# Patient Record
Sex: FEMALE | Race: WHITE | NOT HISPANIC OR LATINO | Employment: OTHER | URBAN - METROPOLITAN AREA
[De-identification: names, ages, dates, MRNs, and addresses within clinical notes are randomized per-mention and may not be internally consistent; named-entity substitution may affect disease eponyms.]

---

## 2023-07-03 LAB
ALANINE AMINOTRANSFERASE (SGPT) (U/L) IN SER/PLAS: 17 U/L (ref 7–45)
ALBUMIN (G/DL) IN SER/PLAS: 4.2 G/DL (ref 3.4–5)
ALKALINE PHOSPHATASE (U/L) IN SER/PLAS: 65 U/L (ref 33–136)
ANION GAP IN SER/PLAS: 12 MMOL/L (ref 10–20)
ASPARTATE AMINOTRANSFERASE (SGOT) (U/L) IN SER/PLAS: 15 U/L (ref 9–39)
BILIRUBIN TOTAL (MG/DL) IN SER/PLAS: 0.7 MG/DL (ref 0–1.2)
CALCIUM (MG/DL) IN SER/PLAS: 10 MG/DL (ref 8.6–10.3)
CARBON DIOXIDE, TOTAL (MMOL/L) IN SER/PLAS: 29 MMOL/L (ref 21–32)
CHLORIDE (MMOL/L) IN SER/PLAS: 104 MMOL/L (ref 98–107)
CHOLESTEROL (MG/DL) IN SER/PLAS: 144 MG/DL (ref 0–199)
CHOLESTEROL IN HDL (MG/DL) IN SER/PLAS: 54.6 MG/DL
CHOLESTEROL/HDL RATIO: 2.6
CREATININE (MG/DL) IN SER/PLAS: 0.91 MG/DL (ref 0.5–1.05)
GFR FEMALE: 65 ML/MIN/1.73M2
GLUCOSE (MG/DL) IN SER/PLAS: 93 MG/DL (ref 74–99)
LDL: 65 MG/DL (ref 0–99)
POTASSIUM (MMOL/L) IN SER/PLAS: 4.2 MMOL/L (ref 3.5–5.3)
PROTEIN TOTAL: 6.7 G/DL (ref 6.4–8.2)
SODIUM (MMOL/L) IN SER/PLAS: 141 MMOL/L (ref 136–145)
TRIGLYCERIDE (MG/DL) IN SER/PLAS: 122 MG/DL (ref 0–149)
UREA NITROGEN (MG/DL) IN SER/PLAS: 17 MG/DL (ref 6–23)
VLDL: 24 MG/DL (ref 0–40)

## 2023-10-04 ENCOUNTER — TELEPHONE (OUTPATIENT)
Dept: PULMONOLOGY | Facility: CLINIC | Age: 78
End: 2023-10-04
Payer: MEDICARE

## 2023-10-04 DIAGNOSIS — J45.40 MODERATE PERSISTENT ASTHMA WITHOUT COMPLICATION (HHS-HCC): Primary | ICD-10-CM

## 2023-10-04 RX ORDER — BUDESONIDE AND FORMOTEROL FUMARATE DIHYDRATE 160; 4.5 UG/1; UG/1
2 AEROSOL RESPIRATORY (INHALATION)
Qty: 180 EACH | Refills: 3 | Status: SHIPPED | OUTPATIENT
Start: 2023-10-04 | End: 2024-03-18 | Stop reason: SDUPTHER

## 2023-10-04 NOTE — TELEPHONE ENCOUNTER
Patient would like to discuss her nebulizer meds making her feel sick. She would like to know if she can go back on an inhaler...please call to advise

## 2023-10-04 NOTE — TELEPHONE ENCOUNTER
Patient with like to stop the budesonide nebulization and a formoterol nebulization and go back to Symbicort.  That is fine.  I will call in a prescription to [facility] along with a spacer.

## 2023-10-20 ENCOUNTER — APPOINTMENT (OUTPATIENT)
Dept: SURGERY | Facility: CLINIC | Age: 78
End: 2023-10-20

## 2023-12-07 ENCOUNTER — APPOINTMENT (OUTPATIENT)
Dept: RADIOLOGY | Facility: CLINIC | Age: 78
End: 2023-12-07
Payer: MEDICARE

## 2023-12-22 ENCOUNTER — TELEPHONE (OUTPATIENT)
Dept: CARDIOLOGY | Facility: HOSPITAL | Age: 78
End: 2023-12-22
Payer: MEDICARE

## 2023-12-22 DIAGNOSIS — I10 HYPERTENSION, UNSPECIFIED TYPE: Primary | ICD-10-CM

## 2023-12-22 RX ORDER — AMLODIPINE BESYLATE 5 MG/1
5 TABLET ORAL DAILY
COMMUNITY
End: 2023-12-22 | Stop reason: SDUPTHER

## 2023-12-22 RX ORDER — AMLODIPINE BESYLATE 5 MG/1
5 TABLET ORAL DAILY
Qty: 90 TABLET | Refills: 3 | Status: SHIPPED | OUTPATIENT
Start: 2023-12-22 | End: 2024-12-21

## 2023-12-31 ENCOUNTER — DOCUMENTATION (OUTPATIENT)
Dept: PULMONOLOGY | Facility: HOSPITAL | Age: 78
End: 2023-12-31
Payer: MEDICARE

## 2023-12-31 DIAGNOSIS — U07.1 COVID: Primary | ICD-10-CM

## 2023-12-31 NOTE — PROGRESS NOTES
Patient has come down with acute COVID and with her underlying chronic lung disease she really should be on an antiviral.  Paxlovid will interact with her Lamictal and alternatively we will give molnupiravir which will not interact.

## 2024-01-04 ENCOUNTER — APPOINTMENT (OUTPATIENT)
Dept: RADIOLOGY | Facility: CLINIC | Age: 79
End: 2024-01-04
Payer: MEDICARE

## 2024-01-11 ENCOUNTER — APPOINTMENT (OUTPATIENT)
Dept: CARDIOLOGY | Facility: HOSPITAL | Age: 79
End: 2024-01-11
Payer: MEDICARE

## 2024-01-15 ENCOUNTER — DOCUMENTATION (OUTPATIENT)
Dept: PULMONOLOGY | Facility: HOSPITAL | Age: 79
End: 2024-01-15
Payer: MEDICARE

## 2024-01-15 DIAGNOSIS — J01.90 ACUTE NON-RECURRENT SINUSITIS, UNSPECIFIED LOCATION: Primary | ICD-10-CM

## 2024-01-15 RX ORDER — AMOXICILLIN AND CLAVULANATE POTASSIUM 500; 125 MG/1; MG/1
500 TABLET, FILM COATED ORAL 2 TIMES DAILY
Qty: 20 TABLET | Refills: 0 | Status: SHIPPED | OUTPATIENT
Start: 2024-01-15 | End: 2024-01-25

## 2024-01-15 NOTE — PROGRESS NOTES
Patient has gotten over her acute COVID and now she has a sinusitis.  Postnasal drip and congestion in her sinuses.  Will place her on Augmentin and she will keep me posted.

## 2024-01-18 ENCOUNTER — TELEPHONE (OUTPATIENT)
Dept: SCHEDULING | Age: 79
End: 2024-01-18
Payer: MEDICARE

## 2024-01-18 DIAGNOSIS — I10 ESSENTIAL HYPERTENSION: Primary | ICD-10-CM

## 2024-01-23 RX ORDER — CLONIDINE HYDROCHLORIDE 0.1 MG/1
0.1 TABLET ORAL DAILY
Qty: 90 TABLET | Refills: 3 | Status: SHIPPED | OUTPATIENT
Start: 2024-01-23 | End: 2025-01-22

## 2024-02-08 ENCOUNTER — HOSPITAL ENCOUNTER (EMERGENCY)
Facility: HOSPITAL | Age: 79
Discharge: HOME | End: 2024-02-08
Payer: MEDICARE

## 2024-02-08 VITALS
HEART RATE: 73 BPM | TEMPERATURE: 97.5 F | DIASTOLIC BLOOD PRESSURE: 68 MMHG | SYSTOLIC BLOOD PRESSURE: 128 MMHG | WEIGHT: 155 LBS | BODY MASS INDEX: 22.96 KG/M2 | HEIGHT: 69 IN | OXYGEN SATURATION: 97 % | RESPIRATION RATE: 16 BRPM

## 2024-02-08 DIAGNOSIS — R04.0 ANTERIOR EPISTAXIS: Primary | ICD-10-CM

## 2024-02-08 PROCEDURE — 99283 EMERGENCY DEPT VISIT LOW MDM: CPT

## 2024-02-08 PROCEDURE — 2500000001 HC RX 250 WO HCPCS SELF ADMINISTERED DRUGS (ALT 637 FOR MEDICARE OP)

## 2024-02-08 PROCEDURE — 99282 EMERGENCY DEPT VISIT SF MDM: CPT | Mod: 27

## 2024-02-08 RX ORDER — OXYMETAZOLINE HCL 0.05 %
2 SPRAY, NON-AEROSOL (ML) NASAL ONCE
Status: COMPLETED | OUTPATIENT
Start: 2024-02-08 | End: 2024-02-08

## 2024-02-08 RX ORDER — OXYMETAZOLINE HCL 0.05 %
SPRAY, NON-AEROSOL (ML) NASAL
Status: COMPLETED
Start: 2024-02-08 | End: 2024-02-08

## 2024-02-08 RX ADMIN — OXYMETAZOLINE HYDROCHLORIDE 2 SPRAY: 0.05 SPRAY NASAL at 12:10

## 2024-02-08 RX ADMIN — Medication 2 SPRAY: at 12:10

## 2024-02-08 ASSESSMENT — COLUMBIA-SUICIDE SEVERITY RATING SCALE - C-SSRS
2. HAVE YOU ACTUALLY HAD ANY THOUGHTS OF KILLING YOURSELF?: NO
6. HAVE YOU EVER DONE ANYTHING, STARTED TO DO ANYTHING, OR PREPARED TO DO ANYTHING TO END YOUR LIFE?: NO
1. IN THE PAST MONTH, HAVE YOU WISHED YOU WERE DEAD OR WISHED YOU COULD GO TO SLEEP AND NOT WAKE UP?: NO

## 2024-02-08 NOTE — ED PROVIDER NOTES
Chief Complaint   Patient presents with    Epistaxis (Nose Bleed)     Pt reports having nose bleed for the past hour. Pt states she has been having frequent nose bleeds for the past month and has had approximately four in the last 30 days. Pt states she take 81mg ASA at night.      HPI:   Luis Ann is an 78 y.o.-year-old female with a history of nosebleeds presenting with epistaxis that started today.  She explains her nose was bleeding on and off for about 2 hours.  She was holding pressure during this time.  She explains that the nose will bleed and she will blow her nose and the clot will come off and the nose will begin bleeding again.  She is unsure of triggers. She explains that since arriving to the ED the bleeding has stopped.  She explains she has been taking her blood pressure medications as prescribed.  She takes 81 mg ASA daily.  Denies trauma/picking.  No dizziness or lightheadedness.  No chest pain, palpitations, or shortness of breath.  Denies headache.  Denies constitutional symptoms.     Medications: Amlodipine, clonidine, Symbicort  Soc HX: Denies substance use  Allergies   Allergen Reactions    Penicillins GI Upset   :  Past Medical History:   Diagnosis Date    Other injury of unspecified body region, initial encounter 10/18/2016    Broken bones    Other specified noninflammatory disorders of vagina 09/21/2020    Vaginal irritation    Personal history of other mental and behavioral disorders     History of depression    Pure hypercholesterolemia, unspecified     High cholesterol     Past Surgical History:   Procedure Laterality Date    CT ANGIO HEART CORONARY  8/10/2017    CT HEART CORONARY ANGIOGRAM 8/10/2017 [facility]    CT ANGIO HEART CORONARY  10/7/2022    CT HEART CORONARY ANGIOGRAM 10/7/2022 [facility]    FOOT SURGERY  09/19/2013    Foot Surgery Right    LUMBAR LAMINECTOMY  06/23/2015    Laminectomy Lumbar    MOUTH SURGERY  10/16/2015    Oral Surgery Tooth Extraction     OTHER SURGICAL HISTORY  10/14/2014    Destruction Of Hemorrhoids    RHINOPLASTY  10/14/2014    Rhinoplasty     No family history on file.     Physical Exam  Vitals and nursing note reviewed.   Constitutional:       General: She is not in acute distress.     Appearance: She is well-developed.   HENT:      Head: Normocephalic and atraumatic.      Right Ear: Tympanic membrane normal.      Left Ear: Tympanic membrane normal.      Nose:      Comments: There is a small 2 mm area in the anterior left nare that has clotted     Mouth/Throat:      Mouth: Mucous membranes are moist.      Pharynx: Oropharynx is clear.   Eyes:      Extraocular Movements: Extraocular movements intact.      Conjunctiva/sclera: Conjunctivae normal.      Pupils: Pupils are equal, round, and reactive to light.   Cardiovascular:      Rate and Rhythm: Normal rate and regular rhythm.      Heart sounds: No murmur heard.  Pulmonary:      Effort: Pulmonary effort is normal. No respiratory distress.      Breath sounds: Normal breath sounds.   Abdominal:      General: There is no distension.      Palpations: Abdomen is soft.      Tenderness: There is no abdominal tenderness. There is no guarding.   Musculoskeletal:         General: No swelling.      Cervical back: Neck supple.   Skin:     General: Skin is warm and dry.      Capillary Refill: Capillary refill takes less than 2 seconds.   Neurological:      General: No focal deficit present.      Mental Status: She is alert and oriented to person, place, and time.   Psychiatric:         Mood and Affect: Mood normal.         Behavior: Behavior normal.           VS: As documented in the triage note and EMR flowsheet from this visit were reviewed.    Medical Decision Making: This is a 78-year-old female presenting with epistaxis.  She explains that she has a history of these for over the last couple years.  She explains that the nose will bleed and she will blow it and the clot will break loose and that will  begin bleeding again.  Differential diagnosis includes hypertensive emergency, epistaxis, vaginal trauma, rhinosinusitis,  Diagnoses as of 02/08/24 1217   Anterior epistaxis       Procedures    Social Determinants of Health: None     Prescription Drug Consideration: Considered NSAIDs but she takes 81 mg of ASA daily    Counseling: Spoke with the patient and discussed today´s findings, in addition to providing specific details for the plan of care and expected course.  Patient was given the opportunity to ask questions.    Discussed return precautions and importance of follow-up.  Advised to follow-up with ENT.    Advised to return to the ED for changing or worsening symptoms, new symptoms, complaint specific precautions, and precautions listed on the discharge paperwork.  Educated on the common potential side effects of medications prescribed.    I advised the patient that the emergency evaluation and treatment provided today doesn't end their need for medical care. It is very important that they follow-up with their primary care provider or other specialist as instructed.    The plan of care was mutually agreed upon with the patient. The patient and/or family were given the opportunity to ask questions. All questions asked today in the ED were answered to the best of my ability with today's information.    I specifically advised the patient to return to the ED for changing or worsening symptoms, worrisome new symptoms, or for any complaint specific precautions listed on the discharge paperwork.    This patient was cared for in the setting of nationwide stress on resources and staffing.    This report was transcribed using voice recognition software.  Every effort was made to ensure accuracy, however, inadvertently computerized transcription errors may be present.       Krishan Mejias PA-C  02/08/24 [id number]

## 2024-02-14 ENCOUNTER — HOSPITAL ENCOUNTER (OUTPATIENT)
Dept: RADIOLOGY | Facility: CLINIC | Age: 79
Discharge: HOME | End: 2024-02-14
Payer: MEDICARE

## 2024-02-14 ENCOUNTER — OFFICE VISIT (OUTPATIENT)
Dept: OTOLARYNGOLOGY | Facility: CLINIC | Age: 79
End: 2024-02-14
Payer: MEDICARE

## 2024-02-14 VITALS — WEIGHT: 155 LBS | HEIGHT: 69 IN | BODY MASS INDEX: 22.96 KG/M2

## 2024-02-14 DIAGNOSIS — R04.0 EPISTAXIS: ICD-10-CM

## 2024-02-14 DIAGNOSIS — J34.89 NASAL LESION: Primary | ICD-10-CM

## 2024-02-14 DIAGNOSIS — J32.3 CHRONIC SPHENOIDAL SINUSITIS: ICD-10-CM

## 2024-02-14 DIAGNOSIS — Z12.39 ENCOUNTER FOR OTHER SCREENING FOR MALIGNANT NEOPLASM OF BREAST: ICD-10-CM

## 2024-02-14 PROCEDURE — 1036F TOBACCO NON-USER: CPT | Performed by: NURSE PRACTITIONER

## 2024-02-14 PROCEDURE — 77067 SCR MAMMO BI INCL CAD: CPT

## 2024-02-14 PROCEDURE — 1160F RVW MEDS BY RX/DR IN RCRD: CPT | Performed by: NURSE PRACTITIONER

## 2024-02-14 PROCEDURE — 77067 SCR MAMMO BI INCL CAD: CPT | Mod: BILATERAL PROCEDURE | Performed by: RADIOLOGY

## 2024-02-14 PROCEDURE — 31231 NASAL ENDOSCOPY DX: CPT | Performed by: NURSE PRACTITIONER

## 2024-02-14 PROCEDURE — 1157F ADVNC CARE PLAN IN RCRD: CPT | Performed by: NURSE PRACTITIONER

## 2024-02-14 PROCEDURE — 1125F AMNT PAIN NOTED PAIN PRSNT: CPT | Performed by: NURSE PRACTITIONER

## 2024-02-14 PROCEDURE — 1159F MED LIST DOCD IN RCRD: CPT | Performed by: NURSE PRACTITIONER

## 2024-02-14 PROCEDURE — 99214 OFFICE O/P EST MOD 30 MIN: CPT | Performed by: NURSE PRACTITIONER

## 2024-02-14 PROCEDURE — 77063 BREAST TOMOSYNTHESIS BI: CPT | Mod: BILATERAL PROCEDURE | Performed by: RADIOLOGY

## 2024-02-14 RX ORDER — MUPIROCIN 20 MG/G
OINTMENT TOPICAL
Qty: 30 G | Refills: 0 | Status: SHIPPED | OUTPATIENT
Start: 2024-02-14

## 2024-02-14 RX ORDER — ESCITALOPRAM OXALATE 20 MG/1
20 TABLET ORAL
COMMUNITY
Start: 2008-08-28

## 2024-02-14 RX ORDER — ASPIRIN 81 MG/1
81 TABLET ORAL
COMMUNITY

## 2024-02-14 RX ORDER — LAMOTRIGINE 200 MG/1
100 TABLET ORAL 2 TIMES DAILY
COMMUNITY
Start: 2022-01-15

## 2024-02-14 RX ORDER — LOSARTAN POTASSIUM 50 MG/1
100 TABLET ORAL
COMMUNITY
Start: 2018-02-27 | End: 2024-03-28 | Stop reason: SDUPTHER

## 2024-02-14 RX ORDER — EZETIMIBE 10 MG/1
10 TABLET ORAL DAILY
COMMUNITY
Start: 2017-08-15

## 2024-02-14 RX ORDER — MONTELUKAST SODIUM 10 MG/1
10 TABLET ORAL DAILY
COMMUNITY
End: 2024-03-18 | Stop reason: SDUPTHER

## 2024-02-14 RX ORDER — CLONIDINE HYDROCHLORIDE 0.1 MG/1
0.1 TABLET ORAL
COMMUNITY
End: 2024-03-28 | Stop reason: ALTCHOICE

## 2024-02-14 RX ORDER — GABAPENTIN 300 MG/1
300 CAPSULE ORAL NIGHTLY
COMMUNITY
Start: 2022-06-09

## 2024-02-14 ASSESSMENT — PATIENT HEALTH QUESTIONNAIRE - PHQ9
2. FEELING DOWN, DEPRESSED OR HOPELESS: NOT AT ALL
SUM OF ALL RESPONSES TO PHQ9 QUESTIONS 1 AND 2: 0
1. LITTLE INTEREST OR PLEASURE IN DOING THINGS: NOT AT ALL

## 2024-02-14 NOTE — PROGRESS NOTES
Subjective   Patient ID: Luis Ann is a 78 y.o. female who presents for New Patient Visit and Epistaxis (Nose Bleed).  HPI  Luis Ann  is a 78 y.o. old female, referred by Krishan MEHTA, who presents for evaluation of epistaxis or nosebleeds that started on the left side many years ago. The nosebleeds generally last about an hour and stop with holding pressure.  Prior intervention for nasal bleeding includes nasal cautery. The patient has history of epistaxis. The last episode began on the left side about 6 days ago.  The patient has been to the ER for the nosebleeds. The nose has been cauterized in the past 4 times most recently about 10 years ago. Patient reports taking the following anticoagulants/antiplatelet agents: ASA 81mg.      Patient denies hx trauma. No history of uncontrolled hypertension. Pt has no history of significant exposure to toxic fumes, nickel, or wood dust.  Patient denies intranasal steroid use, repetitive digital trauma, or intranasal drug use.     Patient has history of rhinoplasty at 16 yo. She also had a procedure by Dr. Hernandez Sutton for migraines.    Patient denies any other sinonasal complaints.     Prior imaging: CT Head March 2023 from [facility]. Report reads near complete opacification of the left sphenoid sinus.     I have also reviewed prior note from Krishan MEHTA dated 2/8/24 and this is contributing to my history and assessment.     Review of Systems  Review of systems is negative for constitutional, ophthalmological, cardiac, pulmonary, renal, gastrointestinal, musculoskeletal, mental health, endocrine, or neurologic disorders (except as listed in the HPI, PMH, and Problem List).     Objective   Physical Exam  CONSTITUTIONAL: Vital signs reviewed. Patient appears well developed and well nourished.   GENERAL: this is a healthy appearing female who appears stated age. The patient is alert and appropriately verbally conversant without hoarseness. This  patient is in no apparent distress.   FACE: The face was inspected and no cutaneous masses or lesions were visualized. There was no erythema or edema noted. Facial movement was symmetric. No skin lesions were detected. There was no sinus tenderness elicited. TMJ crepitus absent.   EYES: Extra-ocular muscle function was intact. No nystagmus was observed. Pupils were equal.   CRANIAL NERVES: Cranial nerves II, III, IV, and VI were noted to be intact via extra-ocular muscle movement testing. Cranial nerve VII noted to be intact and symmetric by facial movement. Cranial nerves IX and X noted to be intact by gag reflex and palatal movement. Cranial nerve XII noted to be intact by active and symmetric tongue movement.   NOSE: Examination of the nose revealed the nasal dorsum to be midline. Intranasal exam reveals the septum is deviated to the left posteriorly. Along the left anterior septum, there is a thick growth protruding from the septum with scabbing. The inferior turbinates were healthy. No masses, polyps, mucopus, or other lesion on anterior rhinoscopy. See below procedure note as applicable for further exam.  ORAL CAVITY: Examination of the oral cavity revealed no mass lesions nor infection. The palate was noted to be intact. The tongue exhibited normal mobility. Mucosa was moist without lesion. The lips were free of lesion. Gums were free of inflammation. Dentition: normal without obvious infection or inflammation  OROPHARYNX: The oral pharynx was free of mass lesion or mucosal abnormality. The palate was noted to be without lesion. The uvula was normal appearing. The tonsils were Normal.  EARS: Examination of the ears revealed that the auricles were normally formed with no lesions. The external auditory canals were normal. The tympanic membranes were intact.  There is no inflammation visualized.   NECK: Visualization and palpation of the neck revealed no mass lesions. No skin lesions or inflammatory processes  were detected. The cervical musculature was normal to palpation.   CERVICAL LYMPHATICS: There were no palpable lymph nodes in the posterior triangle, submandibular triangle, jugulodigastric region, or central neck.  RESPIRATORY: Normal inspiration and expiration and chest wall expansion, no use of accessory muscles to breathe, no stridor.  NEUROLOGICAL: Patient is ambulatory without assist. Mentation is clear. Answering questions appropriately.     Nasal / sinus endoscopy    Date/Time: 2/14/2024 3:23 PM    Performed by: SHARON Latham  Authorized by: SHARON Latham    Consent:     Consent obtained:  Verbal    Consent given by:  Patient    Risks discussed:  Bleeding, infection and pain    Alternatives discussed:  No treatment, observation and delayed treatment  Procedure details:     Indications: assessment of airway and sino-nasal symptoms      Medication:  Afrin and Khadar-Synephrine 2%    Instrument: flexible fiberoptic nasal endoscope      Scope location: bilateral nare    Post-procedure details:     Patient tolerance of procedure:  Tolerated well, no immediate complications  Comments:      Findings: After topical decongestion with decongestant and anesthetic spray, nasal endoscopy was performed using an endoscope. The septum was deviated left posteriorly. Along the left anterior septum, there is a thick scabbed growth protruding from the septum. The inferior turbinates were healthy.  The middle turbinates appeared healthy, the middle meatus is free of purulence, masses, lesions or polyps. The superior meatus and sphenoethmoid recess are clear bilaterally. The nasal passageway is patent. The nasopharynx was clear. There were no complications and the patient tolerated the procedure well.        Assessment/Plan     ASSESSMENT:   Luis Ann is a 78 y.o. year old female with epistaxis localized to a thick growth along the left nasal septum. 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 Will obtain prior imaging from [place] prior to recommendations.     PLAN:   1. Nasal endoscopy: mild left dev, thick scabbing/growth from the left anterior septum.   2. Prior [facility]. Report reads near complete opacification of the left sphenoid sinus.   3. I recommended the patient use a humidifier in the bedroom and in the house  4. Patient advised to use mupirocin ointment at least 2 times per day, by placing on the pad of the thumb and swiping into the nostril, going forward after next follow up. Prescribed today.  5. Discussed epistaxis prevention and acute treatment - Afrin spray PRN for bleeding, apply pressure for 10 minutes if bleeding occurs.  6. I will contact the patient upon receiving the imaging from [place] with further recommendations. Patient agrees with established plan of care and all questions were answered.

## 2024-02-14 NOTE — PATIENT INSTRUCTIONS
Today you were evaluated by Lisa Alvares CNP.    Please follow-up to be determined. If you have any questions or concerns, please contact my office at [phone].     - Begin Mupirocin ointment 3 times daily FOR 4 WEEKS as directed. Use the pads of your fingers to apply the ointment and then sniff back gently. Do not use a cue tip or finger nail to place the ointment as this can cause further trauma. IF THE PRESCRIBED OINTMENT IS TOO EXPENSIVE THEN JUST USE OVER THE COUNTER BACITRACIN OR TRIPLE ANTIBIOTIC OINTMENT.     NOSE CARE and NOSEBLEED PREVENTION  - Do not stick anything in your nose other than the medicine as noted below. DO NOT pick your nose as this will cause the bleeding  - Avoid any nose blowing, sneeze with your mouth open, avoid any maneuvers that increase the blood pressure in your head (such as straining on the toilet) and keep your head above your heart as much as possible.  - We recommended the patient use a humidifier in the bedroom and in the house.  - Start using nasal saline gel (Ayr nasal gel) at least 3 times per day. Alternatively, you can also use Vasaline three times daily. You can also use a saline nasal spray (Ocean nasal spray) 4-6 times daily. These are all over the counter medications  - We discussed nosebleed prevention and acute treatment - Afrin or oxymetazoline spray, 2 sprays in each nostril for bleeding, apply pressure for 10 minutes across the soft part of the nose (pinch your nostrils together). If bleeding occurs reapply for another 10 minutes. If this doesn't work then call our office or go to the Emergency Department.

## 2024-02-22 ENCOUNTER — TELEPHONE (OUTPATIENT)
Dept: OTOLARYNGOLOGY | Facility: CLINIC | Age: 79
End: 2024-02-22
Payer: MEDICARE

## 2024-02-22 DIAGNOSIS — J32.3 CHRONIC SPHENOIDAL SINUSITIS: ICD-10-CM

## 2024-02-22 DIAGNOSIS — J32.8 OTHER CHRONIC SINUSITIS: ICD-10-CM

## 2024-02-22 DIAGNOSIS — J34.89 NASAL LESION: ICD-10-CM

## 2024-02-22 DIAGNOSIS — J32.8 OTHER CHRONIC SINUSITIS: Primary | ICD-10-CM

## 2024-02-22 RX ORDER — DOXYCYCLINE 100 MG/1
100 TABLET ORAL 2 TIMES DAILY
Qty: 20 TABLET | Refills: 0 | Status: SHIPPED | OUTPATIENT
Start: 2024-02-22 | End: 2024-03-03

## 2024-02-22 RX ORDER — DOXYCYCLINE 100 MG/1
100 TABLET ORAL 2 TIMES DAILY
Qty: 20 TABLET | Refills: 0 | Status: SHIPPED | OUTPATIENT
Start: 2024-02-22 | End: 2024-02-22 | Stop reason: SDUPTHER

## 2024-02-22 NOTE — TELEPHONE ENCOUNTER
Called patient to discuss that I have not received her imaging from [place]. We discussed that prior imaging indicated that she has opacification of her sphenoid sinus and I recommended antibiotic therapy and CT Sinus to further assess this. She verbalizes agreement. I recommended doxycycline BID x 10 days. Advised to take with food and discontinue for adverse effects. Recommended she obtain a CT Sinus for surgical planning following completion after. Patient verbalizes understanding instructions and agrees with established plan of care.

## 2024-03-07 ENCOUNTER — HOSPITAL ENCOUNTER (OUTPATIENT)
Dept: RADIOLOGY | Facility: CLINIC | Age: 79
Discharge: HOME | End: 2024-03-07
Payer: MEDICARE

## 2024-03-07 DIAGNOSIS — J32.8 OTHER CHRONIC SINUSITIS: ICD-10-CM

## 2024-03-07 PROCEDURE — 70486 CT MAXILLOFACIAL W/O DYE: CPT

## 2024-03-13 ENCOUNTER — APPOINTMENT (OUTPATIENT)
Dept: ORTHOPEDIC SURGERY | Facility: CLINIC | Age: 79
End: 2024-03-13
Payer: MEDICARE

## 2024-03-15 ENCOUNTER — TELEMEDICINE (OUTPATIENT)
Dept: OTOLARYNGOLOGY | Facility: CLINIC | Age: 79
End: 2024-03-15
Payer: MEDICARE

## 2024-03-15 DIAGNOSIS — J32.3 CHRONIC SPHENOIDAL SINUSITIS: Primary | ICD-10-CM

## 2024-03-15 PROCEDURE — 1160F RVW MEDS BY RX/DR IN RCRD: CPT | Performed by: NURSE PRACTITIONER

## 2024-03-15 PROCEDURE — 1036F TOBACCO NON-USER: CPT | Performed by: NURSE PRACTITIONER

## 2024-03-15 PROCEDURE — 1157F ADVNC CARE PLAN IN RCRD: CPT | Performed by: NURSE PRACTITIONER

## 2024-03-15 PROCEDURE — 1159F MED LIST DOCD IN RCRD: CPT | Performed by: NURSE PRACTITIONER

## 2024-03-15 PROCEDURE — 99213 OFFICE O/P EST LOW 20 MIN: CPT | Performed by: NURSE PRACTITIONER

## 2024-03-15 NOTE — PROGRESS NOTES
Subjective   Patient ID: Luis Ann is a 78 y.o. female who presents for No chief complaint on file..  HPI  Luis Ann is a 78 y.o. year old female with epistaxis localized to a thick growth along the left nasal septum. Incidentally noted to have chronic sphenoid sinusitis versus a sphenoid mass. Will obtain prior imaging from [place] prior to recommendations.   3/15/24- TELEHEALTH (audio only)- Patient presents for follow up visit. She has a history of migraine headaches and frequent headaches to the left side of her head, behind her eye. This has been consistent her whole life. Her bleeding has subsided with use of the mupirocin ointment. She denies any nasal drainage    I personally reviewed the patients CT scan images and results. I discussed the results personally with the patient. The following findings were discussed: 3/7/24: CT Sinus: Septum midline. Near complete opacification of the left sphenoid sinus. Appears to potentially have altered density elements.       Review of Systems  Review of systems is negative for constitutional, ophthalmological, cardiac, pulmonary, renal, gastrointestinal, musculoskeletal, mental health, endocrine, or neurologic disorders (except as listed in the HPI, PMH, and Problem List).     Objective   Physical Exam    Assessment/Plan     ASSESSMENT:   Luis Ann is a 78 y.o. year old female with epistaxis localized to a thick growth along the left nasal septum. Incidentally noted to have chronic sphenoid sinusitis versus a sphenoid mass. Will obtain prior imaging from [place] prior to recommendations.   3/15/24- TELEHEALTH (audio)- Near complete opacification of the left sphenoid sinus. Surgical referral to Dr. Deejay Pierce.    PLAN:   1. Telehealth visit today.  2. I personally reviewed the patients CT scan images and results. I discussed the results personally with the patient. The following findings were discussed: 3/7/24: CT Sinus: Septum midline. Near  complete opacification of the left sphenoid sinus. Appears to potentially have altered density elements.   3. The patient and myself had an extensive discussion regarding next steps for further management. We discussed endoscopic sinus surgery at length. We discussed implications for treatment. Patient referred for surgical management with Dr. Deejay Pierce. His office was contacted directly for scheduling.  4. Patient agrees with established plan of care and all questions were answered.    11 minutes was spent on this patient's visit. More than 50% of that time was spent in counseling regarding the possible etiologies, test results, treatment options and coordinating care.

## 2024-03-18 DIAGNOSIS — J45.40 MODERATE PERSISTENT ASTHMA WITHOUT COMPLICATION (HHS-HCC): ICD-10-CM

## 2024-03-18 DIAGNOSIS — E78.5 HYPERLIPIDEMIA, UNSPECIFIED HYPERLIPIDEMIA TYPE: Primary | ICD-10-CM

## 2024-03-18 RX ORDER — ATORVASTATIN CALCIUM 40 MG/1
40 TABLET, FILM COATED ORAL DAILY
Qty: 30 TABLET | Refills: 11 | Status: SHIPPED | OUTPATIENT
Start: 2024-03-18 | End: 2025-03-13

## 2024-03-18 RX ORDER — BUDESONIDE AND FORMOTEROL FUMARATE DIHYDRATE 160; 4.5 UG/1; UG/1
2 AEROSOL RESPIRATORY (INHALATION)
Qty: 10.2 G | Refills: 11 | Status: SHIPPED | OUTPATIENT
Start: 2024-03-18 | End: 2024-09-14

## 2024-03-18 RX ORDER — MONTELUKAST SODIUM 10 MG/1
10 TABLET ORAL DAILY
Qty: 30 TABLET | Refills: 11 | Status: SHIPPED | OUTPATIENT
Start: 2024-03-18

## 2024-03-18 RX ORDER — ATORVASTATIN CALCIUM 40 MG/1
40 TABLET, FILM COATED ORAL DAILY
COMMUNITY
End: 2024-03-18 | Stop reason: SDUPTHER

## 2024-03-27 DIAGNOSIS — J45.40 MODERATE PERSISTENT ASTHMA WITHOUT COMPLICATION (HHS-HCC): ICD-10-CM

## 2024-03-27 NOTE — PROGRESS NOTES
Primary Care Physician: Royer Gonzales MD MPH      Date of Visit: 03/28/2024  8:00 AM EDT  Location of visit: [facility]   Type of Visit: Established Patient     HPI / Summary:   Patient is a 78 year old woman with HTN, hyperlipidemia and known sarcoidosis ( lung involvement) with moderate airflow obstruction and mild restriction with moderate LAD stenosis by CTA with heart flow in 2017 who was initially referred by Dr Gonzales for assessment of progressive ROBLERO who returns for follow up      CAD risk factors: + HTN, hyperlipidemia, no DM, no FHx CAD, never smoked  prior cardiac work up ( Reggie hicks 2017) :  + Stress EKG for SOB.   Aug 10 2017: dense calcified plaque in the proximal LAD with 50-70% stenosis. FFR was negative ( lowest value 0.82) Other vessels without significant stenoses.   Decision to pursue medical management.   Patient with h/o lung sarcoid- had significant shortness of breath with the sarcoid, however feels that after having the sarcoid treated her breathing was markedly improved until the summer of 2020 when she noted progressive ROBLERO. She also some SOB at rest. She denied orthopnea PND or LE edema. She got SOB with minimal exertion and fell on her left side. SInce then she had constant left sided chest pain, but later noted exertional chest pressure. Noted increased HR when exerting herself. Noted palpitations on exertion and also with anxiety.      Work up included cardiac MRI 10/6/2020: mildly dilated LV with normal LV systolic function with LVEF 45  %, no fibrosis infiltration or scarring. Moderately dilated LA. Specifically no evidence for cardiac sarcoid  Cardiopulmonary stress test (CPET) Sept 30,2020: patient was hypertensive, blunted HR response ( from 66 to 92 bpm, no evidence of pulmonary limitation overall consistent with deconditioning.   Cardiac Monitor ( 2 WEEK) October 2020: HR 52 to 90 bpm avg 64bpm, 144 VEs SVEs 15,   Cardiac cath ( [facility]) 9/15/2020: LAD prox  40-50%, Mid 20-30%, LM < 30%, RCA normal, LCx < 30%.   Renal artery ultrasound 9/23/2020: no renal artery stenosis     Prior to back surgery had CTA with heart flow October 10 2022: prox LAD 50-75% D2 25-50%, LCx 25%, RCA no atherosclerosis. Most places FFR > 0.8. D1 with FFR 0.74 proximally consistent with hemodynamically sifgnificant lesion. However having reviewed study with cardiologist reading exam- D1 is very small vessel ( 3mm) so even though lesion was significant, the vessel is small with a very small territory. Medical management was indicated and there was no indication for cath given small size of vessel.     Pts BP at home running 123-133mmHg systolic ( does not remember diastolic Bps). She has no CP or SOB at rest or on exertion. She has no myalgias on her atorvastatin. She walks a total of 2 miles daily. She may get a little dizzy if she gets up to fast, especially in the AM when getting out of bed. We discussed dangling legs at tside of bed for a few miutes and getting up in stages to alleviate dizziness and avoid falls. She had some ROBLERO earlier, but had stopped her inhaler and nebulizer. Once she resumed those she no longer has any ROBLERO.     ROS: Full 10 pt review of symptoms of negative unless discussed above.     Problems:   There is no problem list on file for this patient.    Medical History:   Past Medical History:   Diagnosis Date    Other injury of unspecified body region, initial encounter 10/18/2016    Broken bones    Other specified noninflammatory disorders of vagina 09/21/2020    Vaginal irritation    Personal history of other mental and behavioral disorders     History of depression    Pure hypercholesterolemia, unspecified     High cholesterol     Surgical Hx:   Past Surgical History:   Procedure Laterality Date    CT ANGIO CORONARY ART WITH HEARTFLOW IF SCORE >30%  8/10/2017    CT HEART CORONARY ANGIOGRAM 8/10/2017 [facility]    CT ANGIO CORONARY ART WITH HEARTFLOW IF SCORE >30%   "10/7/2022    CT HEART CORONARY ANGIOGRAM 10/7/2022 [facility]CILL[facility]GAC[facility]    FOOT SURGERY  09/19/2013    Foot Surgery Right    LUMBAR LAMINECTOMY  06/23/2015    Laminectomy Lumbar    MOUTH SURGERY  10/16/2015    Oral Surgery Tooth Extraction    OTHER SURGICAL HISTORY  10/14/2014    Destruction Of Hemorrhoids    RHINOPLASTY  10/14/2014    Rhinoplasty      Social Hx:   Tobacco Use: Low Risk  (3/28/2024)    Patient History     Smoking Tobacco Use: Never     Smokeless Tobacco Use: Never     Passive Exposure: Not on file     Alcohol Use: Not on file     Family Hx:   No family history on file.   Exam:   Vitals:   Vitals:    03/28/24 0803 03/28/24 0913   BP: 157/88 146/88   BP Location: Left arm    Patient Position: Sitting    Pulse: 66    SpO2: 94%    Height: 1.753 m (5' 9\")      Wt Readings from Last 5 Encounters:   02/14/24 70.3 kg (155 lb)   02/08/24 70.3 kg (155 lb)   09/08/23 78.7 kg (173 lb 7 oz)   07/03/23 78 kg (172 lb)   05/23/23 77.2 kg (170 lb 2 oz)      Constitutional:       Appearance: Healthy appearance. Not in distress.   Pulmonary:      Effort: Pulmonary effort is normal.      Breath sounds: Normal breath sounds.   Cardiovascular:      Normal rate. Regular rhythm. S1 with normal intensity. S2 with normal intensity.       Murmurs: There is no murmur.   Edema:     Peripheral edema absent.   Abdominal:      General: Bowel sounds are normal.      Palpations: Abdomen is soft.   Neurological:      Mental Status: Alert and oriented to person, place and time.       Labs:   Recent Labs     10/17/22  0647 10/16/22  0822 10/15/22  1000 10/07/22  1336 06/09/22  1033 06/14/21  1141 09/01/20  0925 06/12/19  1449   WBC 15.8* 19.2* 12.8* 8.0 7.4 6.2 7.1 7.8   HGB 14.7 15.6 12.5 14.1 13.6 14.0 14.8 14.7   HCT 45.3 48.3* 39.9 44.6 44.2 43.8 47.5* 46.9*    252 182 225 209 205 206 242   MCV 91 91 92 92 92 91 92 89     Recent Labs     07/03/23  1155 10/20/22  1015 10/19/22  1050 10/17/22  0647 10/16/22  0822 " 10/15/22  1000 10/07/22  1336 09/03/22  1136    139 143 140 144 142 140 140   K 4.2 2.9* 3.2* 3.4* 3.4* 4.2 4.7 4.4    100 102 102 107 107 106 105   BUN 17 12 16 16 18 14 21 16   CREATININE 0.91 0.57 0.55 0.57 0.72 0.81 0.81 0.78      Recent Labs     09/01/20  0925   BNP 66     Lab Results   Component Value Date    CHOL 144 07/03/2023    HDL 54.6 07/03/2023    LDLF 65 07/03/2023    TRIG 122 07/03/2023     Notable Studies: imaging personally reviewed and summarized by me below  EKG:  Encounter Date: 01/11/24   ECG 12 lead (Clinic Performed)   Result Value    Ventricular Rate 66    Atrial Rate 66    IN Interval 196    QRS Duration 102    QT Interval 414    QTC Calculation(Bazett) 434    P Axis 50    R Axis -30    T Axis 66    QRS Count 10    Q Onset 211    P Onset 113    P Offset 174    T Offset 418    QTC Fredericia 427    Narrative    Normal sinus rhythm  Left axis deviation  Left ventricular hypertrophy with repolarization abnormality  Abnormal ECG  When compared with ECG of 03-JUL-2023 10:41,  Sinus rhythm has replaced Ectopic atrial rhythm  Minimal criteria for Septal infarct are no longer Present  Non-specific change in ST segment in Lateral leads  Nonspecific T wave abnormality no longer evident in Inferior leads  Nonspecific T wave abnormality, improved in Anterolateral leads     EKG today. NSR at 66 bpm, left axis deviation and LVH with repolarization abnl. Unchanged from 1/11/2024    Echo:  - Echo (9/10/2020)  PHYSICIAN INTERPRETATION:  Left Ventricle: The left ventricular systolic function is normal, with an estimated ejection fraction of 60-65%. There are no regional wall motion abnormalities. The left ventricular cavity size is normal. There is moderate eccentric left ventricular hypertrophy. Spectral Doppler shows a pseudonormal pattern of left ventricular diastolic filling.  Left Atrium: The left atrium is moderately dilated.  Right Ventricle: The right ventricle is normal in size. There is  low normal right ventricular systolic function.  Right Atrium: The right atrium is normal in size.  Aortic Valve: The aortic valve is trileaflet. There is no evidence of aortic valve regurgitation. The peak instantaneous gradient of the aortic valve is 13.5 mmHg.  Mitral Valve: The mitral valve is normal in structure. There is mild mitral annular calcification. There is trace mitral valve regurgitation.  Tricuspid Valve: The tricuspid valve is structurally normal. There is trace tricuspid regurgitation. The Doppler estimated RVSP is slightly elevated at 33.5 mmHg.  Pulmonic Valve: The pulmonic valve is not well visualized. The pulmonic valve regurgitation was not well visualized.  Pericardium: There is no pericardial effusion noted.  Aorta: The aortic root is normal.  Systemic Veins: The inferior vena cava appears to be of normal size. There is IVC inspiratory collapse greater than 50%.  In comparison to the previous echocardiogram(s): Compared with study from 7/21/2015,. Compared with the prior exam from 7/21/2015 there is still preserved LV systolic funciton, however the LA size has greatly increased and there is now pseudonormal diastolic function. The RVSP is similar. Previously was slightly increased at 31mmHg.        CONCLUSIONS:   1. The left ventricular systolic function is normal with a 60-65% estimated ejection fraction.   2. Poorly visualized anatomical structures due to suboptimal image quality.   3. Spectral Doppler shows a pseudonormal pattern of left ventricular diastolic filling.   4. There is moderate eccentric left ventricular hypertrophy.   5. The left atrium is moderately dilated.   6. Slightly elevated RVSP.   7. Compared with the prior exam from 7/21/2015 there is still preserved LV systolic funciton, however the LA size has greatly increased and there is now pseudonormal diastolic function. The RVSP is similar. Previously was slightly increased at 31mmHg.      Catheterization:  - [facility]  (9/15/2020)  CONCLUSIONS:   1. Non-obstructive CAD (40-50% ostial LAD) in a codominant system.   2. Tortuous coronary arteries.   3. Elevated LVEDP of 25 mmHg.      CPET 9/30/2020  Impression:   1. Abnormal cardiopulmonary exercise test.   2. The test was submaximal limiting interpretation. RER did not exceed 1, HR max was only 63% predicted. No ventilatory threshold was achieved.   3. The patient was hypertensive at rest 154/78.   4. The HR response to exercise seemed blunted, 66->92. Significant exercise motion artifact limits interpretation of exercise ECG. No ischemic changes were present during recovery.   5. There was no evidence of respiratory limitation during this limited exam. Breathing reserve was 56%, no significant desaturation was seen. There was a normal drop of dead space with exercise.   6. Overall interpretation is that of deconditioning. Cardiac limitation may be present as Chronotropic response seemed blunted and the patient was hypertensive at rest.      cCTA with heartflow 10/7/2022:   IMPRESSION:  1. No significant left main coronary disease.  2. Mixed atherosclerosis involving the proximal LAD resulting in  50-75% luminal stenosis. Study was sent to heart [facility] for evaluation  of FFR CT. Report will be addended once results are available.  3. Mild calcified atherosclerosis involving the proximal LCX  resulting in less than 25% luminal stenosis.  4. Partially visualized nodularity in the right upper lobe which was  previously seen on chest CT 08/24/2029. Recommend continued follow-up  with chest CT for full characterization.  eart flow results:  First Diagonal branch FFRCT 0.74 proximally, suggestive of  hemodynamically-significant lesion  Remainder of the coronary arteries FFRCT>0.8, reflecting  non-hemodynamically lesion    Current Outpatient Medications   Medication Instructions    amLODIPine (NORVASC) 5 mg, oral, Daily    aspirin (ADULT LOW DOSE ASPIRIN) 81 mg, oral    atorvastatin  (LIPITOR) 40 mg, oral, Daily    budesonide-formoteroL (Symbicort) 160-4.5 mcg/actuation inhaler 2 puffs, inhalation, 2 times daily RT, Rinse mouth with water after use to reduce aftertaste and incidence of candidiasis. Do not swallow.    cloNIDine (CATAPRES) 0.1 mg, oral, Daily    escitalopram (LEXAPRO) 20 mg, oral, Daily RT    ezetimibe (ZETIA) 10 mg, oral, Daily    gabapentin (NEURONTIN) 300 mg, oral, Nightly    lamoTRIgine (LAMICTAL) 100 mg, oral, 2 times daily    losartan (COZAAR) 100 mg, oral, Daily    montelukast (SINGULAIR) 10 mg, oral, Daily, as directed    mupirocin (Bactroban) 2 % ointment Apply a pea sized amount to the pad of the thumb, swipe into the nostril, sniff, then pinch the nose 2-3 times daily. Do NOT use a Q-tip.    TRAZODONE HCL ER PO traZODone HCl     Impressions and Plan:    Patient is a 78 year old woman with sarcoidosis ( lung ) with prior history of moderate proximal LAD lesion ( CTA with herat flow in 2017) with HTN,and hyperlipidemia who has been medically managed since 2017. Previously  noted  progressive ROBLERO  with CP on exertion starting in summer 2020. Cardiac cath in September 2020 showed moderate LAD disease ( no significant stenoses) and CPET eventually showed deconditioning as well as blunted HR response. Prior to back surgery had CTA with heart flow showing nonsignifiant stenoses except for in D1 which was a small vessel( 3mm) that would not warrant cardiac cath. Pursuing medical therapy at this time. She is tolerating her atorvastatin well without myalgias and a good response with LDL at target.   Plan  1. CAD- atorvastatin, zetia losartan and aspirin.  2. hyperlipidemia- continue atorvastatin 40 mg daily and zetia. LDL target <70 . Due for recheck of lipid panel in July .  3. HTN- BP well controlled at home . To continue clonidine .1mg, losartan 100 mg, and amlodipine 5 mg daily. BP a bit high in office today. Pt to bring in monitor at next visit to check accuracy  4. Dyspnea-  resolved  5. sarcoid- rx of lung sarcoid as per Dr Gonzales.     Return to clinic in 3 months with lipids and LFTs 1 week prior to visit. .     Patient Instructions:  If you have any questions or need cardiac medication refills, please call my office at [phone],      To reach my office please call [phone]  To schedule an appointment call [phone].          ____________________________________________________________  Rea Ramirez MD  Division of Cardiovascular Medicine  [facility]  [facility]

## 2024-03-28 ENCOUNTER — OFFICE VISIT (OUTPATIENT)
Dept: CARDIOLOGY | Facility: HOSPITAL | Age: 79
End: 2024-03-28
Payer: MEDICARE

## 2024-03-28 VITALS
DIASTOLIC BLOOD PRESSURE: 88 MMHG | OXYGEN SATURATION: 94 % | SYSTOLIC BLOOD PRESSURE: 146 MMHG | HEART RATE: 66 BPM | HEIGHT: 69 IN | BODY MASS INDEX: 22.89 KG/M2

## 2024-03-28 DIAGNOSIS — E78.5 HYPERLIPIDEMIA, UNSPECIFIED HYPERLIPIDEMIA TYPE: ICD-10-CM

## 2024-03-28 DIAGNOSIS — I25.10 CORONARY ARTERY DISEASE INVOLVING NATIVE CORONARY ARTERY OF NATIVE HEART WITHOUT ANGINA PECTORIS: ICD-10-CM

## 2024-03-28 DIAGNOSIS — I10 HYPERTENSION, UNSPECIFIED TYPE: Primary | ICD-10-CM

## 2024-03-28 PROCEDURE — 1036F TOBACCO NON-USER: CPT | Performed by: INTERNAL MEDICINE

## 2024-03-28 PROCEDURE — 99214 OFFICE O/P EST MOD 30 MIN: CPT | Performed by: INTERNAL MEDICINE

## 2024-03-28 PROCEDURE — 1157F ADVNC CARE PLAN IN RCRD: CPT | Performed by: INTERNAL MEDICINE

## 2024-03-28 PROCEDURE — 3079F DIAST BP 80-89 MM HG: CPT | Performed by: INTERNAL MEDICINE

## 2024-03-28 PROCEDURE — 1159F MED LIST DOCD IN RCRD: CPT | Performed by: INTERNAL MEDICINE

## 2024-03-28 PROCEDURE — 1160F RVW MEDS BY RX/DR IN RCRD: CPT | Performed by: INTERNAL MEDICINE

## 2024-03-28 PROCEDURE — 93005 ELECTROCARDIOGRAM TRACING: CPT | Performed by: INTERNAL MEDICINE

## 2024-03-28 PROCEDURE — 3077F SYST BP >= 140 MM HG: CPT | Performed by: INTERNAL MEDICINE

## 2024-03-28 RX ORDER — LOSARTAN POTASSIUM 100 MG/1
100 TABLET ORAL DAILY
Qty: 90 TABLET | Refills: 3 | Status: SHIPPED | OUTPATIENT
Start: 2024-03-28 | End: 2025-03-28

## 2024-04-03 ENCOUNTER — OFFICE VISIT (OUTPATIENT)
Dept: OTOLARYNGOLOGY | Facility: CLINIC | Age: 79
End: 2024-04-03
Payer: MEDICARE

## 2024-04-03 DIAGNOSIS — J32.3 CHRONIC SPHENOIDAL SINUSITIS: Primary | ICD-10-CM

## 2024-04-03 DIAGNOSIS — R04.0 EPISTAXIS: ICD-10-CM

## 2024-04-03 DIAGNOSIS — D86.0 PULMONARY SARCOIDOSIS (MULTI): ICD-10-CM

## 2024-04-03 PROCEDURE — 1157F ADVNC CARE PLAN IN RCRD: CPT | Performed by: OTOLARYNGOLOGY

## 2024-04-03 PROCEDURE — 31231 NASAL ENDOSCOPY DX: CPT | Performed by: OTOLARYNGOLOGY

## 2024-04-03 PROCEDURE — 99213 OFFICE O/P EST LOW 20 MIN: CPT | Performed by: OTOLARYNGOLOGY

## 2024-04-03 PROCEDURE — 1036F TOBACCO NON-USER: CPT | Performed by: OTOLARYNGOLOGY

## 2024-04-03 PROCEDURE — 1159F MED LIST DOCD IN RCRD: CPT | Performed by: OTOLARYNGOLOGY

## 2024-04-03 PROCEDURE — 1160F RVW MEDS BY RX/DR IN RCRD: CPT | Performed by: OTOLARYNGOLOGY

## 2024-04-03 ASSESSMENT — PATIENT HEALTH QUESTIONNAIRE - PHQ9
1. LITTLE INTEREST OR PLEASURE IN DOING THINGS: NOT AT ALL
2. FEELING DOWN, DEPRESSED OR HOPELESS: NOT AT ALL
SUM OF ALL RESPONSES TO PHQ9 QUESTIONS 1 AND 2: 0

## 2024-04-03 NOTE — PROGRESS NOTES
Chief Complaint:  Left sphenoid sinusitis and epistaxis.    Referring Provider: No ref. provider found    History of Present Illness:    Luis Ann is a 78 y.o. female, presenting for evaluation of left sphenoid sinus opacification and left-sided epistaxis request of Lisa Alvares.  She has had longstanding epistaxis on the left side which has responded to topical mupirocin.  She used to have chronic migraines which would be triggered by left-sided headaches but these have not occurred recently.  She did obtain a CT of the sinuses during her workup with Lisa which demonstrated opacification of the left sphenoid sinus.  She subsequently was referred to me for further evaluation.  The etiology of the sphenoid sinus opacification is unknown.  She has a prior history of inferior turbinate reduction with [facility] and [facility] as well as a cosmetic rhinoplasty when she was 18 years old.  She has been diagnosed with pulmonary sarcoidosis.  No other pertinent details.    ?  Review of Systems:    Review of symptoms was negative except for those stated including Cardiopulmonary, Genitourinary, Gastrointestinal, Psychological, Sleep pattern, Endocrine, Eyes, Neurologic, Musculoskeletal, Skin, Hematologic/Lymphatic and Allergic/Immunologic.     Medical History:    I have reviewed the patient's updated past medical history, surgical history, family history, social history, as well as current medications and allergies as of 4/3/2024. Changes to these items have been updated and marked as reviewed in the electronic medical record.    Physical Exam:    Vitals:  vitals were not taken for this visit.   General: Patient doing well overall and is in no apparent distress.  Psych: Pleasant affect, and answers questions appropriately.  Head & Face: Symmetric facial movements  Eyes: Pupils equal, round, reactive.  Extraocular movements intact without gaze restrictions or nystagmus. No epiphora.  Ears:  External  auditory canals are normal.  Tympanic membranes are clear.  No middle ear effusion is seen.  All middle ear landmarks are normal.  Nose: Anterior rhinoscopy revealed normal sinonasal mucosa. More posterior areas of the nasal cavity could not be completely examined.  Oral Cavity/Oropharynx:  Without lesions or masses to visual exam.  Neck: Supple without lymphadenopathy.  Lungs: Non-labored, and without evidence of stridor.  Cardiac: Pulses are strong, well-perfused.  Extremities: Without gross evidence of clubbing, cyanosis, or edema.  Neuro: Cranial nerves II-XII grossly intact; Intact facial movements.    Procedure:  Rigid nasal endoscopy (46616)  Pre-procedure diagnosis/Indication for procedure:  To evaluate areas not visualized on anterior rhinoscopy   Anesthesia:  None  Description:  A 0-degree 3-mm rigid nasal endoscope was used to examine the left and right nasal cavities.  The nasal valve areas were examined for abnormalities or collapse.  The inferior and middle turbinates were evaluated.  The middle and superior meatuses, and the sphenoethmoid recesses were examined and inspected for mucopurulence and polyps. Once the endoscope was withdrawn, the patient was noted to have tolerated the procedure well without complications and was returned to ambulatory status.    Findings:    There is a small fleshy/polypoid lesion of the anterior nasal vestibule extending from the septum to the nasal floor.  It measures roughly 4 to 5 mm in diameter.  There is a left-sided septal deviation which prevents me from visualizing the middle meatus.  The nasopharynx is clear.  On the right side this middle meatus is clear.  No lesions or polyps.  She tolerated the procedure well.      Assessment:     Luis Ann is a 78 y.o. female with left sphenoid sinusitis, left nasal cavity lesion, left septal deviation, pulmonary sarcoidosis.    Plan:      I went over Luis's CT in depth.  It demonstrates opacification of the left  sphenoid sinus which is isolated.  This may represent a mycetoma chronic infection or neoplasm.  She also had a small lesion of the anterior nasal cavity which appears to be where she has been bleeding from.  I would recommend removal at that at the same time as sphenoidotomy.  She also has a left-sided septal deviation which may require repair in order to access the left sphenoid sinus.  I went over the risks benefits and alternatives with to be and she is eager to proceed.    Deejay Pierce MD, M.Eng.   of Otolaryngology - Head & Neck Surgery  Division of Rhinology and Endoscopic Skull Base Surgery  [facility]

## 2024-04-03 NOTE — H&P (VIEW-ONLY)
Chief Complaint:  Left sphenoid sinusitis and epistaxis.    Referring Provider: No ref. provider found    History of Present Illness:    Luis Ann is a 78 y.o. female, presenting for evaluation of left sphenoid sinus opacification and left-sided epistaxis request of Lisa Alvares.  She has had longstanding epistaxis on the left side which has responded to topical mupirocin.  She used to have chronic migraines which would be triggered by left-sided headaches but these have not occurred recently.  She did obtain a CT of the sinuses during her workup with Lisa which demonstrated opacification of the left sphenoid sinus.  She subsequently was referred to me for further evaluation.  The etiology of the sphenoid sinus opacification is unknown.  She has a prior history of inferior turbinate reduction with [facility] and [facility] as well as a cosmetic rhinoplasty when she was 18 years old.  She has been diagnosed with pulmonary sarcoidosis.  No other pertinent details.    ?  Review of Systems:    Review of symptoms was negative except for those stated including Cardiopulmonary, Genitourinary, Gastrointestinal, Psychological, Sleep pattern, Endocrine, Eyes, Neurologic, Musculoskeletal, Skin, Hematologic/Lymphatic and Allergic/Immunologic.     Medical History:    I have reviewed the patient's updated past medical history, surgical history, family history, social history, as well as current medications and allergies as of 4/3/2024. Changes to these items have been updated and marked as reviewed in the electronic medical record.    Physical Exam:    Vitals:  vitals were not taken for this visit.   General: Patient doing well overall and is in no apparent distress.  Psych: Pleasant affect, and answers questions appropriately.  Head & Face: Symmetric facial movements  Eyes: Pupils equal, round, reactive.  Extraocular movements intact without gaze restrictions or nystagmus. No epiphora.  Ears:  External  auditory canals are normal.  Tympanic membranes are clear.  No middle ear effusion is seen.  All middle ear landmarks are normal.  Nose: Anterior rhinoscopy revealed normal sinonasal mucosa. More posterior areas of the nasal cavity could not be completely examined.  Oral Cavity/Oropharynx:  Without lesions or masses to visual exam.  Neck: Supple without lymphadenopathy.  Lungs: Non-labored, and without evidence of stridor.  Cardiac: Pulses are strong, well-perfused.  Extremities: Without gross evidence of clubbing, cyanosis, or edema.  Neuro: Cranial nerves II-XII grossly intact; Intact facial movements.    Procedure:  Rigid nasal endoscopy ([id number])  Pre-procedure diagnosis/Indication for procedure:  To evaluate areas not visualized on anterior rhinoscopy   Anesthesia:  None  Description:  A 0-degree 3-mm rigid nasal endoscope was used to examine the left and right nasal cavities.  The nasal valve areas were examined for abnormalities or collapse.  The inferior and middle turbinates were evaluated.  The middle and superior meatuses, and the sphenoethmoid recesses were examined and inspected for mucopurulence and polyps. Once the endoscope was withdrawn, the patient was noted to have tolerated the procedure well without complications and was returned to ambulatory status.    Findings:    There is a small fleshy/polypoid lesion of the anterior nasal vestibule extending from the septum to the nasal floor.  It measures roughly 4 to 5 mm in diameter.  There is a left-sided septal deviation which prevents me from visualizing the middle meatus.  The nasopharynx is clear.  On the right side this middle meatus is clear.  No lesions or polyps.  She tolerated the procedure well.      Assessment:     Luis Ann is a 78 y.o. female with left sphenoid sinusitis, left nasal cavity lesion, left septal deviation, pulmonary sarcoidosis.    Plan:      I went over Luis's CT in depth.  It demonstrates opacification of the left  sphenoid sinus which is isolated.  This may represent a mycetoma chronic infection or neoplasm.  She also had a small lesion of the anterior nasal cavity which appears to be where she has been bleeding from.  I would recommend removal at that at the same time as sphenoidotomy.  She also has a left-sided septal deviation which may require repair in order to access the left sphenoid sinus.  I went over the risks benefits and alternatives with to be and she is eager to proceed.    Deejay Pierce MD, M.Eng.   of Otolaryngology - Head & Neck Surgery  Division of Rhinology and Endoscopic Skull Base Surgery  [facility]

## 2024-04-05 DIAGNOSIS — J32.3 CHRONIC SPHENOIDAL SINUSITIS: ICD-10-CM

## 2024-04-05 DIAGNOSIS — J34.89 NASAL LESION: ICD-10-CM

## 2024-04-06 LAB
ATRIAL RATE: 66 BPM
P AXIS: 50 DEGREES
P OFFSET: 174 MS
P ONSET: 113 MS
PR INTERVAL: 196 MS
Q ONSET: 211 MS
QRS COUNT: 10 BEATS
QRS DURATION: 102 MS
QT INTERVAL: 414 MS
QTC CALCULATION(BAZETT): 434 MS
QTC FREDERICIA: 427 MS
R AXIS: -30 DEGREES
T AXIS: 66 DEGREES
T OFFSET: 418 MS
VENTRICULAR RATE: 66 BPM

## 2024-04-16 ENCOUNTER — TELEPHONE (OUTPATIENT)
Dept: SCHEDULING | Age: 79
End: 2024-04-16
Payer: MEDICARE

## 2024-04-17 ENCOUNTER — PRE-ADMISSION TESTING (OUTPATIENT)
Dept: PREADMISSION TESTING | Facility: HOSPITAL | Age: 79
End: 2024-04-17
Payer: MEDICARE

## 2024-04-17 VITALS
WEIGHT: 173.7 LBS | HEIGHT: 69 IN | SYSTOLIC BLOOD PRESSURE: 143 MMHG | DIASTOLIC BLOOD PRESSURE: 86 MMHG | TEMPERATURE: 98.2 F | HEART RATE: 65 BPM | OXYGEN SATURATION: 95 % | BODY MASS INDEX: 25.73 KG/M2

## 2024-04-17 DIAGNOSIS — J32.3 CHRONIC SPHENOIDAL SINUSITIS: ICD-10-CM

## 2024-04-17 DIAGNOSIS — E87.5 HYPERKALEMIA: ICD-10-CM

## 2024-04-17 DIAGNOSIS — J34.89 NASAL LESION: ICD-10-CM

## 2024-04-17 DIAGNOSIS — Z01.818 PREOPERATIVE EXAMINATION: Primary | ICD-10-CM

## 2024-04-17 DIAGNOSIS — E78.5 HYPERLIPIDEMIA, UNSPECIFIED HYPERLIPIDEMIA TYPE: ICD-10-CM

## 2024-04-17 LAB
ALT SERPL W P-5'-P-CCNC: 17 U/L (ref 7–45)
ANION GAP SERPL CALC-SCNC: 16 MMOL/L (ref 10–20)
AST SERPL W P-5'-P-CCNC: 9 U/L (ref 9–39)
BUN SERPL-MCNC: 27 MG/DL (ref 6–23)
CALCIUM SERPL-MCNC: 10.5 MG/DL (ref 8.6–10.6)
CHLORIDE SERPL-SCNC: 103 MMOL/L (ref 98–107)
CHOLEST SERPL-MCNC: 208 MG/DL (ref 0–199)
CHOLESTEROL/HDL RATIO: 3
CO2 SERPL-SCNC: 29 MMOL/L (ref 21–32)
CREAT SERPL-MCNC: 0.96 MG/DL (ref 0.5–1.05)
EGFRCR SERPLBLD CKD-EPI 2021: 61 ML/MIN/1.73M*2
ERYTHROCYTE [DISTWIDTH] IN BLOOD BY AUTOMATED COUNT: 14.6 % (ref 11.5–14.5)
GLUCOSE SERPL-MCNC: 99 MG/DL (ref 74–99)
HCT VFR BLD AUTO: 47.3 % (ref 36–46)
HDLC SERPL-MCNC: 69.6 MG/DL
HGB BLD-MCNC: 14.6 G/DL (ref 12–16)
LDLC SERPL CALC-MCNC: 122 MG/DL
MCH RBC QN AUTO: 28.6 PG (ref 26–34)
MCHC RBC AUTO-ENTMCNC: 30.9 G/DL (ref 32–36)
MCV RBC AUTO: 93 FL (ref 80–100)
NON HDL CHOLESTEROL: 138 MG/DL (ref 0–149)
NRBC BLD-RTO: 0 /100 WBCS (ref 0–0)
PLATELET # BLD AUTO: 243 X10*3/UL (ref 150–450)
POTASSIUM SERPL-SCNC: 5.6 MMOL/L (ref 3.5–5.3)
RBC # BLD AUTO: 5.11 X10*6/UL (ref 4–5.2)
SODIUM SERPL-SCNC: 142 MMOL/L (ref 136–145)
TRIGL SERPL-MCNC: 84 MG/DL (ref 0–149)
VLDL: 17 MG/DL (ref 0–40)
WBC # BLD AUTO: 9.5 X10*3/UL (ref 4.4–11.3)

## 2024-04-17 PROCEDURE — 80048 BASIC METABOLIC PNL TOTAL CA: CPT | Performed by: INTERNAL MEDICINE

## 2024-04-17 PROCEDURE — 84450 TRANSFERASE (AST) (SGOT): CPT | Performed by: INTERNAL MEDICINE

## 2024-04-17 PROCEDURE — 85027 COMPLETE CBC AUTOMATED: CPT | Performed by: NURSE PRACTITIONER

## 2024-04-17 PROCEDURE — 80061 LIPID PANEL: CPT | Performed by: INTERNAL MEDICINE

## 2024-04-17 PROCEDURE — 99205 OFFICE O/P NEW HI 60 MIN: CPT | Performed by: NURSE PRACTITIONER

## 2024-04-17 PROCEDURE — 36415 COLL VENOUS BLD VENIPUNCTURE: CPT

## 2024-04-17 PROCEDURE — 84460 ALANINE AMINO (ALT) (SGPT): CPT | Performed by: INTERNAL MEDICINE

## 2024-04-17 ASSESSMENT — DUKE ACTIVITY SCORE INDEX (DASI)
CAN YOU WALK A BLOCK OR TWO ON LEVEL GROUND: YES
CAN YOU PARTICIPATE IN MODERATE RECREATIONAL ACTIVITIES LIKE GOLF, BOWLING, DANCING, DOUBLES TENNIS OR THROWING A BASEBALL OR FOOTBALL: NO
CAN YOU DO HEAVY WORK AROUND THE HOUSE LIKE SCRUBBING FLOORS OR LIFTING AND MOVING HEAVY FURNITURE: NO
TOTAL_SCORE: 26.45
CAN YOU DO LIGHT WORK AROUND THE HOUSE LIKE DUSTING OR WASHING DISHES: YES
CAN YOU HAVE SEXUAL RELATIONS: NO
CAN YOU RUN A SHORT DISTANCE: NO
CAN YOU PARTICIPATE IN STRENOUS SPORTS LIKE SWIMMING, SINGLES TENNIS, FOOTBALL, BASKETBALL, OR SKIING: YES
CAN YOU DO MODERATE WORK AROUND THE HOUSE LIKE VACUUMING, SWEEPING FLOORS OR CARRYING GROCERIES: YES
CAN YOU DO YARD WORK LIKE RAKING LEAVES, WEEDING OR PUSHING A MOWER: NO
CAN YOU WALK INDOORS, SUCH AS AROUND YOUR HOUSE: YES
CAN YOU TAKE CARE OF YOURSELF (EAT, DRESS, BATHE, OR USE TOILET): YES
DASI METS SCORE: 6
CAN YOU CLIMB A FLIGHT OF STAIRS OR WALK UP A HILL: YES

## 2024-04-17 ASSESSMENT — ENCOUNTER SYMPTOMS
NEUROLOGICAL NEGATIVE: 1
MUSCULOSKELETAL NEGATIVE: 1
RESPIRATORY NEGATIVE: 1
ENDOCRINE NEGATIVE: 1
VISUAL CHANGE: 1
NECK NEGATIVE: 1
GASTROINTESTINAL NEGATIVE: 1
SINUS CONGESTION: 1
CONSTITUTIONAL NEGATIVE: 1
CARDIOVASCULAR NEGATIVE: 1

## 2024-04-17 ASSESSMENT — LIFESTYLE VARIABLES: SMOKING_STATUS: NONSMOKER

## 2024-04-17 NOTE — PREPROCEDURE INSTRUCTIONS
Thank you for visiting [facility] ([facility]) today for your pre-procedure evaluation, you were seen by     Samantha Meeson, MSN, NP-C  Adult-Gerontology Nurse Practitioner II  Department of Anesthesiology and Perioperative Medicine  Main phone [phone]  Direct phone [phone]  Fax [phone]     This summary includes instructions and information to aid you during your perioperative period.  Please read carefully. If you have any questions about your visit today, please call the number listed above.  If you become ill or have any changes to your health before your surgery, please contact your primary care provider and alert your surgeon.    Preparing for your Surgery       Exercises  Preoperative Deep Breathing Exercises  Why it is important to do deep breathing exercises before my surgery?  Deep breathing exercises strengthen your breathing muscles.  This helps you to recover after your surgery and decreases the chance of breathing complications.  How are the deep breathing exercises done?  Sit straight with your back supported.  Breathe in deeply and slowly through your nose. Your lower rib cage should expand and your abdomen may move forward.  Hold that breath for 3 to 5 seconds.  Breathe out through pursed lips, slowly and completely.  Rest and repeat 10 times every hour while awake.  Rest longer if you become dizzy or lightheaded.       Incentive Spirometer   You were provided with an incentive spirometer in [facility], please follow the below instructions.   You were not provided an incentive spirometer in [facility], please disregard the incentive spirometer instructions  What is an incentive spirometer?  An incentive spirometer is a device used before and after surgery to “exercise” your lungs.  It helps you to take deeper breaths to expand your lungs.  Below is an example of a basic incentive spirometer.  The device you receive may differ slightly but they all function the  same.    Why do I need to use an incentive spirometer?  Using your incentive spirometer prepares your lungs for surgery and helps prevent lung problems after surgery.  How do I use my incentive spirometer?  When you're using your incentive spirometer, make sure to breathe through your mouth. If you breathe through your nose, the incentive spirometer won't work properly. You can hold your nose if you have trouble.  If you feel dizzy at any time, stop and rest. Try again at a later time.  Follow the steps below:  Set up your incentive spirometer, expand the flexible tubing and connect to the outlet.  Sit upright in a chair or bed. Hold the incentive spirometer at eye level.   Put the mouthpiece in your mouth and close your lips tightly around it. Slowly breathe out (exhale) completely.  Breathe in (inhale) slowly through your mouth as deeply as you can. As you take a breath, you will see the piston rise inside the large column. While the piston rises, the indicator should move upwards. It should stay in between the 2 arrows (see Figure).  Try to get the piston as high as you can, while keeping the indicator between the arrows.   If the indicator doesn't stay between the arrows, you're breathing either too fast or too slow.  When you get it as high as you can, hold your breath for 10 seconds, or as long as possible. While you're holding your breath, the piston will slowly fall to the base of the spirometer.  Once the piston reaches the bottom of the spirometer, breathe out slowly through your mouth. Rest for a few seconds.  Repeat 10 times. Try to get the piston to the same level with each breath.  Repeat every hour while awake  You can carefully clean the outside of the mouthpiece with an alcohol wipe or soap and water.      Preoperative Brain Exercises    What are brain exercises?  A brain exercise is any activity that engages your thinking (cognitive) skills.    What types of activities are considered brain  exercises?  Jigsaw puzzles, crossword puzzles, word jumble, memory games, word search, and many more.  Many can be found free online or on your phone via a mobile paulino.    Why should I do brain exercises before my surgery?  More recent research has shown brain exercise before surgery can lower the risk of postoperative delirium (confusion) which can be especially important for older adults.  Patients who did brain exercises for 5 to 10 hours the days before surgery, cut their risk of postoperative delirium in half up to 1 week after surgery.    Sit-to-Stand Exercise    What is the sit-to-stand exercise?  The sit-to-stand exercise strengthens the muscles of your lower body and muscles in the center of your body (core muscles for stability) helping to maintain and improve your strength and mobility.  How do I do the sit-to-stand exercise?  The goal is to do this exercise without using your arms or hands.  If this is too difficult, use your arms and hands or a chair with armrests to help slowly push yourself to the standing position and lower yourself back to the sitting position. As the movement becomes easier use your arms and hands less.    Steps to the sit-to-stand exercise  Sit up tall in a sturdy chair, knees bent, feet flat on the floor shoulder-width apart.  Shift your hips/pelvis forward in the chair to correctly position yourself for the next movement.  Lean forward at your hips.  Stand up straight putting equal weight on both feet.  Check to be sure you are properly aligned with the chair, in a slow controlled movement sit back down.  Repeat this exercise 10-15 times.  If needed you can do it fewer times until your strength improves.  Rest for 1 minute.  Do another 10-15 sit-to-stand exercises.  Try to do this in the morning and evening.        Instructions    Preoperative Fasting Guidelines    Why must I stop eating and drinking near surgery time?  With sedation, food or liquid in your stomach can enter your  lungs causing serious complications  Food can increase nausea and vomiting  When do I need to stop eating and drinking before my surgery?      Do not eat any food after midnight the night before your surgery/procedure. You may have up to TEN ounces of clear liquid until TWO hours before your instructed arrival time to the hospital.  This includes water, black tea/coffee, (no milk or cream) apple juice, and electrolyte drinks (Gatorade). You may chew gum until TWO hours before your surgery/procedure            Simple things you can do to help prevent blood clots     Blood clots are blockages that can form in the body's veins. When a blood clot forms in your deep veins, it may be called a deep vein thrombosis, or DVT for short. Blood clots can happen in any part of the body where blood flows, but they are most common in the arms and legs. If a piece of a blood clot breaks free and travels to the lungs, it is called a pulmonary embolus (PE). A PE can be a very serious problem.         Being in the hospital or having surgery can raise your chances of getting a blood clot because you may not be well enough to move around as much as you normally do.         Ways you can help prevent blood clots in the hospital       Wearing SCDs  SCDs stands for Sequential Compression Devices.   SCDs are special sleeves that wrap around your legs. They attach to a pump that fills them with air to gently squeeze your legs every few minutes.  This helps return the blood in your legs to your heart.   SCDs should only be taken off when walking or bathing. SCDs may not be comfortable, but they can help save your life.              Pump SCD leg sleeves  Wearing compression stockings - if your doctor orders them. These special snug-fitting stockings gently squeeze your legs to help blood flow.       Walking. Walking helps move the blood in your legs.   If your doctor says it is ok, try walking the halls at least   5 times a day. Ask us to help  you get up, so you don't fall.      Taking any blood-thinning medicines your doctor orders.              Ways you can help prevent blood clots at home         Wearing compression stockings - if your doctor orders them.   Walking - to help move the blood in your legs.    Taking any blood-thinning medicines your doctor orders.      Signs of a blood clot or PE    Tell your doctor or nurse right away if you have any of the problems listed below.         If you are at home, seek medical care right away. Call 911 for chest pain or problems breathing.            Signs of a blood clot (DVT) - such as pain, swelling, redness, or warmth in your arm or legs.  Signs of a pulmonary embolism (PE) - such as chest pain or feeling short of breath      Tobacco and Alcohol;  Do not drink alcohol or smoke within 24 hours of surgery.  It is best to quite smoking for as long as possible before any surgery or procedure.      The Week before Surgery        Seven days before Surgery  Check your [facility] medication instructions  Do the exercises provided to you by [facility]   Arrange for a responsible, adult licensed  to take you home after surgery and stay with you for 24 hours.  You will not be permitted to drive yourself home if you have received any anesthetic/sedation  Six days before surgery  Check your [facility] medication instructions  Do the exercises provided to you by [facility]   Start using Chlorhexidene (CHG) body wash if prescribed  Five days before surgery  Check your [facility] medication instructions  Do the exercises provided to you by [facility]   Continue to use CHG body wash if prescribed  Three days before surgery  Check your [facility] medication instructions  Do the exercises provided to you by [facility]   Continue to use CHG body wash if prescribed  Two days before surgery  Check your [facility] medication instructions  Do the exercises provided to you by [facility]   Continue to use CHG body wash if prescribed    The Day before Surgery       Check your [facility] medication and  all other [facility] instructions including when to stop eating and drinking  You will be called with your arrival time for surgery in the late afternoon.  If you do not receive a call please reach out to your surgeon's office.  Do not smoke or drink 24 hours before surgery  Prepare items to bring with you to the hospital  Shower with your chlorhexidine wash if prescribed  Brush your teeth and use your chlorhexidine dental rinse if prescribed    The Day of Surgery       Check your [facility] medication instructions  Ensure you follow the instructions for when to stop eating and drinking  Shower, if prescribed use CHG.  Do not apply any lotions, creams, moisturizers, perfume or deodorant  Brush your teeth and use your CHG dental rinse if prescribed  Wear loose comfortable clothing  Avoid make-up  Remove  jewelry and piercings, consider professional piercing removal with a plastic spacer if needed  Bring photo ID and Insurance card  Bring an accurate medication list that includes medication dose, frequency and allergies  Bring a copy of your advanced directives (will, health care power of )  Bring any devices and controllers as well as medical devices you have been provided with for surgery (CPAP, slings, braces, etc.)  Dentures, eyeglasses, and contacts will be removed before surgery, please bring cases for contacts or glasses

## 2024-04-17 NOTE — CPM/PAT H&P
CPM/PAT Evaluation       Name: Luis Ann (Luis Ann)  /Age: 1945/78 y.o.     Visit Type:   In-Person       Chief Complaint: Chronic sesamoidal sinusitis and nasal lesion scheduled for surgery    HPI: Patient is a 78-year-old female scheduled for bilateral endoscopic sinus surgery on May 3, 2024 for treatment of chronic sesamoidal sinusitis with nasal lesion.  The patient is referred by Dr. 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    Past Medical History:   Diagnosis Date    Asthma (HHS-HCC)     Cataract     Chronic sphenoidal sinusitis     Colon polyp     COPD (chronic obstructive pulmonary disease) (Multi)     Coronary artery disease     Depression     Epistaxis     GERD (gastroesophageal reflux disease)     Hyperlipidemia     Hypertension     Lumbar disc disease     Migraine     Nasal lesion     Non-rheumatic mitral regurgitation     Nonrheumatic aortic (valve) insufficiency     Osteoporosis     Peripheral neuropathy     Pulmonary nodules     Pulmonary sarcoidosis (Multi)     in remission    Spinal stenosis     lumbar    Vision loss     right eye       Past Surgical History:   Procedure Laterality Date    ACHILLES TENDON SURGERY Left     ACHILLES TENDON SURGERY Right     CATARACT EXTRACTION      COLONOSCOPY      CT ANGIO CORONARY ART WITH HEARTFLOW IF SCORE >30%  08/10/2017    CT HEART CORONARY ANGIOGRAM 8/10/2017 [facility]    CT ANGIO CORONARY ART WITH HEARTFLOW IF SCORE >30%  10/07/2022    CT HEART CORONARY ANGIOGRAM 10/7/2022 [facility]    EYE SURGERY  Right     infection    FOOT SURGERY Right     HEMORRHOID SURGERY      LUMBAR LAMINECTOMY  03/2022    MOUTH SURGERY      Tooth Extraction    NASAL TURBINATE REDUCTION      RHINOPLASTY      SPINE SURGERY  10/2022    WRIST SURGERY Left 07/2021       Patient Sexual activity questions deferred to the physician.    Family History   Problem Relation Name Age of Onset    Cancer Mother      Heart disease Mother      Alzheimer's disease Mother      Cancer Father      Heart disease Father      Dementia Father         Allergies   Allergen Reactions    Penicillins GI Upset       Prior to Admission medications    Medication Sig Start Date End Date Taking? Authorizing Provider   amLODIPine (Norvasc) 5 mg tablet Take 1 tablet (5 mg) by mouth once daily. 12/22/23 12/21/24 Yes Rea Ramirez MD   aspirin (Adult Low Dose Aspirin) 81 mg EC tablet Take 1 tablet (81 mg) by mouth.   Yes Historical Provider, MD   atorvastatin (Lipitor) 40 mg tablet Take 1 tablet (40 mg) by mouth once daily for 360 doses. 3/18/24 3/13/25 Yes Royer Gonzales MD MPH   calcium carbonate-vitamin D3 500 mg-5 mcg (200 unit) tablet Take 1 tablet by mouth once daily.   Yes Historical Provider, MD   cloNIDine (Catapres) 0.1 mg tablet Take 1 tablet (0.1 mg) by mouth once daily. 1/23/24 1/22/25 Yes Rea Ramirez MD   escitalopram (Lexapro) 20 mg tablet Take 1 tablet (20 mg) by mouth once daily. 8/28/08  Yes Historical Provider, MD   ezetimibe (Zetia) 10 mg tablet Take 1 tablet (10 mg) by mouth once daily. 8/15/17  Yes Historical Provider, MD   gabapentin (Neurontin) 300 mg capsule Take 1 capsule (300 mg) by mouth once daily at bedtime. 6/9/22  Yes Historical Provider, MD   lamoTRIgine (LaMICtal) 200 mg tablet Take 0.5 tablets (100 mg) by mouth 2 times a day. 1/15/22  Yes Historical Provider, MD   losartan (Cozaar) 100 mg tablet Take 1 tablet (100 mg) by mouth once daily. 3/28/24 3/28/25 Yes Rea Ramirez MD   montelukast (Singulair) 10 mg tablet Take 1 tablet (10  mg) by mouth once daily. as directed 3/18/24  Yes Royer Gonzales MD MPH   TRAZODONE HCL ER PO traZODone HCl   Yes Historical Provider, MD   acetaminophen (Tylenol 8 HOUR) 650 mg ER tablet Take 1 tablet (650 mg) by mouth every 8 hours if needed for mild pain (1 - 3). Do not crush, chew, or split.    Historical Provider, MD   budesonide-formoteroL (Symbicort) 160-4.5 mcg/actuation inhaler Inhale 2 puffs 2 times a day. Rinse mouth with water after use to reduce aftertaste and incidence of candidiasis. Do not swallow. 3/18/24 9/14/24  Royer Gonzales MD MPH   mupirocin (Bactroban) 2 % ointment Apply a pea sized amount to the pad of the thumb, swipe into the nostril, sniff, then pinch the nose 2-3 times daily. Do NOT use a Q-tip. 2/14/24   Lisa Alvares, APRN-CNP        PAT ROS:   Constitutional:   neg    Neuro/Psych:   neg    Eyes:    Lost partial vision in right eye following cataract surgery c/b infection   vision loss  Ears:   neg    Nose:    sinus congestion  Mouth:   neg    Throat:   neg    Neck:   neg    Cardio:   neg    Respiratory:   neg    Endocrine:   neg    GI:   neg    :   neg    Musculoskeletal:   neg    Hematologic:   neg    Skin:  neg        Physical Exam  Vitals reviewed.   Constitutional:       Appearance: Normal appearance.   HENT:      Head: Normocephalic.      Mouth/Throat:      Mouth: Mucous membranes are moist.   Eyes:      Conjunctiva/sclera: Conjunctivae normal.   Neck:      Vascular: No carotid bruit.   Cardiovascular:      Rate and Rhythm: Normal rate and regular rhythm.      Pulses: Normal pulses.      Heart sounds: Normal heart sounds.   Pulmonary:      Effort: Pulmonary effort is normal.      Breath sounds: Normal breath sounds.   Abdominal:      Palpations: Abdomen is soft.      Tenderness: There is no abdominal tenderness.   Musculoskeletal:         General: Normal range of motion.      Cervical back: Normal range of motion.      Right lower leg: No edema.      Left lower  leg: No edema.   Lymphadenopathy:      Cervical: No cervical adenopathy.   Skin:     General: Skin is warm and dry.      Capillary Refill: Capillary refill takes less than 2 seconds.   Neurological:      General: No focal deficit present.      Mental Status: She is alert and oriented to person, place, and time.   Psychiatric:         Mood and Affect: Mood normal.         Behavior: Behavior normal.         Thought Content: Thought content normal.         Judgment: Judgment normal.          PAT AIRWAY:   Airway:     Mallampati::  IV    Neck ROM::  Full  normal        Visit Vitals  /86   Pulse 65   Temp 36.8 °C (98.2 °F)       DASI Risk Score      Flowsheet Row Most Recent Value   DASI SCORE 26.45   METS Score (Will be calculated only when all the questions are answered) 6          Caprini DVT Assessment      Flowsheet Row Most Recent Value   DVT Score 9   Current Status COPD, Major surgery planned, including arthroscopic and laproscopic (1-2 hours)   History Prior major surgery, COPD   Age Over 75 years   BMI 30 or less          Modified Frailty Index      Flowsheet Row Most Recent Value   Modified Frailty Index Calculator .1818          CHADS2 Stroke Risk  Current as of 20 minutes ago        N/A 3 to 100%: High Risk   2 to < 3%: Medium Risk   0 to < 2%: Low Risk     Last Change: N/A          This score determines the patient's risk of having a stroke if the patient has atrial fibrillation.        This score is not applicable to this patient. Components are not calculated.          Revised Cardiac Risk Index      Flowsheet Row Most Recent Value   Revised Cardiac Risk Calculator 0          Apfel Simplified Score      Flowsheet Row Most Recent Value   Apfel Simplified Score Calculator 3          Risk Analysis Index Results This Encounter    No data found in the last 1 encounters.       Stop Bang Score      Flowsheet Row Most Recent Value   Do you snore loudly? 1   Do you often feel tired or fatigued after your  sleep? 1   Has anyone ever observed you stop breathing in your sleep? 0   Do you have or are you being treated for high blood pressure? 1   Recent BMI (Calculated) 22.9   Is BMI greater than 35 kg/m2? 0=No   Age older than 50 years old? 1=Yes   Is your neck circumference greater than 17 inches (Male) or 16 inches (Female)? 0   Gender - Male 0=No   STOP-BANG Total Score 4              Assessment and Plan:   Neuro:  depression managed on escitalopram and lamotrigine (continue both). Migraine headaches managed with PRN pain relievers (hold NSAIDs 7 days) peripheral neuropathy managed with gabapentin (continue).     The patient is at an increased risk for post operative delirium secondary to age >/= 65 and depression.  Preoperative brain exercise educational handout provided to patient.    The patient is at an increased risk for perioperative stroke secondary to cardiac disease, increased age, HTN, HLD, female sex , and general anesthesia.     HEENT/Airway:  chronic sesamoidal sinusitis and nasal lesion scheduled for surgery. 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    Cardiovascular:  CAD- moderate LAD stenosis by CTA with heart flow in 2017-managed on 81mg daily aspirin (continue). EKG 03/28/24 NSR cannot rule out anterior infarct- no acute changes. Cardiac cath 09/15/2020 non-obstructive CAD (40-50% ostial LAD). Hypertension managed on losartan (hold 24 hours), clonidine (continue) and amlodipine (continue).  Hyperlipidemia managed on atorvastatin (continue) and ezetimibe (hold 24 hours). Nonrheumatic mitral regurgitation and insufficiency- echo 09/10/2020 EF 60-65% trace MV regurgitation. Follows with cardiology, Dr. Rea Ramirez, last visit 03/28/24 stable follow up in 3 months. No additional preoperative testing is currently indicated.    METS are 6    RCRI  0 which is 3.9% % 30 day risk of MACE (risk for cardiac death, nonfatal myocardial infarction, and  nonfactal cardiac arrest    RUSTY score which indicates a 0.7% risk of intraoperative or 30-day postoperative MACE      Pulmonary:  asthma and copd managed with inhaler and singulair (continue). pulmonary nodule and pulmonary sarcoidosis in remission. Pulmonary function test performed on 2022 revealed an FVC of 2.45 and an FEV1 of 1.38 about the same as here about a year ago. Her DLCO was 12.7 which is slightly decreased compared to last year. Follwos with pulmonology Dr. Royer Gonzales last visit 23.  Preoperative deep breathing educational handout provided to patient.    ARISCAT:  11  points which is a low (1.6%) risk of in-hospital post-op pulmonary complications     PRODIGY:  12  points which is a intermediate risk of post op opioid induced respiratory depression episodes    STOP BAN   points which is a intermediate risk for moderate to severe GILLIAN. Patient to discuss risk factors with pulmonologist at next appointment.    Renal: No diagnosis or significant findings on chart review or clinical presentation and evaluation, however, the patient is at increased risk of perioperative renal complications secondary to age>/= 56 and HTN. Preventative measures include preoperative BP control.     Endocrine:  per patient steroids for URI end of 2023- consider preoperative stress dosing    Hematologic:   No diagnosis or significant findings on chart review or clinical presentation and evaluation however see below risk screening tool.  Preoperative DVT educational handout provided to patient.    Caprini Score:  9 points which is a highest risk of perioperative VTE    Gastrointestinal:   GERD managed with diet modifications.     EAT-10 score of   0 - self-perceived oropharyngeal dysphagia scale (0-40)     Apfel: 3 points 61%% risk for post operative N/V    Infectious disease:  No diagnosis or significant findings on chart review or clinical presentation and evaluation.      Musculoskeletal:   osteoporosis managed on oral supplementation. lumbar spinal stenosis managed with PRN pain relievers (hold NSAIDs 7 days).       Labs ordered  Results for orders placed or performed in visit on 04/17/24 (from the past 96 hour(s))   Lipid panel   Result Value Ref Range    Cholesterol 208 (H) 0 - 199 mg/dL    HDL-Cholesterol 69.6 mg/dL    Cholesterol/HDL Ratio 3.0     LDL Calculated 122 (H) <=99 mg/dL    VLDL 17 0 - 40 mg/dL    Triglycerides 84 0 - 149 mg/dL    Non HDL Cholesterol 138 0 - 149 mg/dL   AST   Result Value Ref Range    AST 9 9 - 39 U/L   ALT   Result Value Ref Range    ALT 17 7 - 45 U/L   CBC   Result Value Ref Range    WBC 9.5 4.4 - 11.3 x10*3/uL    nRBC 0.0 0.0 - 0.0 /100 WBCs    RBC 5.11 4.00 - 5.20 x10*6/uL    Hemoglobin 14.6 12.0 - 16.0 g/dL    Hematocrit 47.3 (H) 36.0 - 46.0 %    MCV 93 80 - 100 fL    MCH 28.6 26.0 - 34.0 pg    MCHC 30.9 (L) 32.0 - 36.0 g/dL    RDW 14.6 (H) 11.5 - 14.5 %    Platelets 243 150 - 450 x10*3/uL   Basic Metabolic Panel   Result Value Ref Range    Glucose 99 74 - 99 mg/dL    Sodium 142 136 - 145 mmol/L    Potassium 5.6 (H) 3.5 - 5.3 mmol/L    Chloride 103 98 - 107 mmol/L    Bicarbonate 29 21 - 32 mmol/L    Anion Gap 16 10 - 20 mmol/L    Urea Nitrogen 27 (H) 6 - 23 mg/dL    Creatinine 0.96 0.50 - 1.05 mg/dL    eGFR 61 >60 mL/min/1.73m*2    Calcium 10.5 8.6 - 10.6 mg/dL      Repeat basic on 04/18/24 WNL - K+ 4.3

## 2024-04-18 ENCOUNTER — LAB (OUTPATIENT)
Dept: LAB | Facility: LAB | Age: 79
End: 2024-04-18
Payer: MEDICARE

## 2024-04-18 DIAGNOSIS — E87.5 HYPERKALEMIA: ICD-10-CM

## 2024-04-18 DIAGNOSIS — Z01.818 PREOPERATIVE EXAMINATION: ICD-10-CM

## 2024-04-18 LAB
ANION GAP SERPL CALC-SCNC: 12 MMOL/L (ref 10–20)
BUN SERPL-MCNC: 25 MG/DL (ref 6–23)
CALCIUM SERPL-MCNC: 10.4 MG/DL (ref 8.6–10.6)
CHLORIDE SERPL-SCNC: 105 MMOL/L (ref 98–107)
CO2 SERPL-SCNC: 31 MMOL/L (ref 21–32)
CREAT SERPL-MCNC: 0.92 MG/DL (ref 0.5–1.05)
EGFRCR SERPLBLD CKD-EPI 2021: 65 ML/MIN/1.73M*2
GLUCOSE SERPL-MCNC: 92 MG/DL (ref 74–99)
POTASSIUM SERPL-SCNC: 4.3 MMOL/L (ref 3.5–5.3)
SODIUM SERPL-SCNC: 144 MMOL/L (ref 136–145)

## 2024-04-18 PROCEDURE — 36415 COLL VENOUS BLD VENIPUNCTURE: CPT

## 2024-04-18 PROCEDURE — 80048 BASIC METABOLIC PNL TOTAL CA: CPT

## 2024-04-26 ENCOUNTER — ANESTHESIA EVENT (OUTPATIENT)
Dept: OPERATING ROOM | Facility: CLINIC | Age: 79
End: 2024-04-26
Payer: MEDICARE

## 2024-04-29 ENCOUNTER — ANESTHESIA (OUTPATIENT)
Dept: OPERATING ROOM | Facility: CLINIC | Age: 79
End: 2024-04-29
Payer: MEDICARE

## 2024-04-29 ENCOUNTER — HOSPITAL ENCOUNTER (OUTPATIENT)
Facility: CLINIC | Age: 79
Setting detail: OUTPATIENT SURGERY
Discharge: HOME | End: 2024-04-29
Attending: OTOLARYNGOLOGY | Admitting: OTOLARYNGOLOGY
Payer: MEDICARE

## 2024-04-29 VITALS
SYSTOLIC BLOOD PRESSURE: 173 MMHG | BODY MASS INDEX: 25.8 KG/M2 | DIASTOLIC BLOOD PRESSURE: 82 MMHG | WEIGHT: 174.16 LBS | HEIGHT: 69 IN | HEART RATE: 83 BPM | OXYGEN SATURATION: 95 % | TEMPERATURE: 97.7 F | RESPIRATION RATE: 16 BRPM

## 2024-04-29 DIAGNOSIS — J32.3 CHRONIC SPHENOIDAL SINUSITIS: Primary | ICD-10-CM

## 2024-04-29 DIAGNOSIS — J34.89 NASAL LESION: ICD-10-CM

## 2024-04-29 PROBLEM — I25.10 CORONARY ARTERY DISEASE INVOLVING NATIVE CORONARY ARTERY OF NATIVE HEART WITHOUT ANGINA PECTORIS: Status: ACTIVE | Noted: 2024-04-29

## 2024-04-29 PROBLEM — C61 PROSTATE CANCER (MULTI): Status: ACTIVE | Noted: 2024-04-29

## 2024-04-29 PROBLEM — I10 PRIMARY HYPERTENSION: Status: ACTIVE | Noted: 2024-04-29

## 2024-04-29 PROBLEM — F32.4 MAJOR DEPRESSIVE DISORDER IN PARTIAL REMISSION (CMS-HCC): Status: ACTIVE | Noted: 2024-04-29

## 2024-04-29 PROCEDURE — 2720000007 HC OR 272 NO HCPCS: Performed by: OTOLARYNGOLOGY

## 2024-04-29 PROCEDURE — 31287 NASAL/SINUS ENDOSCOPY SURG: CPT | Performed by: OTOLARYNGOLOGY

## 2024-04-29 PROCEDURE — 7100000009 HC PHASE TWO TIME - INITIAL BASE CHARGE: Performed by: OTOLARYNGOLOGY

## 2024-04-29 PROCEDURE — 7100000002 HC RECOVERY ROOM TIME - EACH INCREMENTAL 1 MINUTE: Performed by: OTOLARYNGOLOGY

## 2024-04-29 PROCEDURE — 2500000004 HC RX 250 GENERAL PHARMACY W/ HCPCS (ALT 636 FOR OP/ED): Performed by: STUDENT IN AN ORGANIZED HEALTH CARE EDUCATION/TRAINING PROGRAM

## 2024-04-29 PROCEDURE — 2500000005 HC RX 250 GENERAL PHARMACY W/O HCPCS: Performed by: STUDENT IN AN ORGANIZED HEALTH CARE EDUCATION/TRAINING PROGRAM

## 2024-04-29 PROCEDURE — 3600000003 HC OR TIME - INITIAL BASE CHARGE - PROCEDURE LEVEL THREE: Performed by: OTOLARYNGOLOGY

## 2024-04-29 PROCEDURE — 2500000001 HC RX 250 WO HCPCS SELF ADMINISTERED DRUGS (ALT 637 FOR MEDICARE OP): Performed by: OTOLARYNGOLOGY

## 2024-04-29 PROCEDURE — 88305 TISSUE EXAM BY PATHOLOGIST: CPT | Mod: TC,SUR,WESLAB | Performed by: OTOLARYNGOLOGY

## 2024-04-29 PROCEDURE — 87102 FUNGUS ISOLATION CULTURE: CPT | Performed by: OTOLARYNGOLOGY

## 2024-04-29 PROCEDURE — A4217 STERILE WATER/SALINE, 500 ML: HCPCS | Performed by: OTOLARYNGOLOGY

## 2024-04-29 PROCEDURE — A31287 PR NASAL/SINUS ENDOSCOPY,SPHENOIDOTOMY: Performed by: STUDENT IN AN ORGANIZED HEALTH CARE EDUCATION/TRAINING PROGRAM

## 2024-04-29 PROCEDURE — 88305 TISSUE EXAM BY PATHOLOGIST: CPT | Performed by: DENTIST

## 2024-04-29 PROCEDURE — 7100000001 HC RECOVERY ROOM TIME - INITIAL BASE CHARGE: Performed by: OTOLARYNGOLOGY

## 2024-04-29 PROCEDURE — 30520 REPAIR OF NASAL SEPTUM: CPT | Performed by: OTOLARYNGOLOGY

## 2024-04-29 PROCEDURE — 2500000004 HC RX 250 GENERAL PHARMACY W/ HCPCS (ALT 636 FOR OP/ED): Performed by: OTOLARYNGOLOGY

## 2024-04-29 PROCEDURE — 99100 ANES PT EXTEME AGE<1 YR&>70: CPT | Performed by: STUDENT IN AN ORGANIZED HEALTH CARE EDUCATION/TRAINING PROGRAM

## 2024-04-29 PROCEDURE — 3700000002 HC GENERAL ANESTHESIA TIME - EACH INCREMENTAL 1 MINUTE: Performed by: OTOLARYNGOLOGY

## 2024-04-29 PROCEDURE — 61782 SCAN PROC CRANIAL EXTRA: CPT | Performed by: OTOLARYNGOLOGY

## 2024-04-29 PROCEDURE — 3700000001 HC GENERAL ANESTHESIA TIME - INITIAL BASE CHARGE: Performed by: OTOLARYNGOLOGY

## 2024-04-29 PROCEDURE — 7100000010 HC PHASE TWO TIME - EACH INCREMENTAL 1 MINUTE: Performed by: OTOLARYNGOLOGY

## 2024-04-29 PROCEDURE — 3600000008 HC OR TIME - EACH INCREMENTAL 1 MINUTE - PROCEDURE LEVEL THREE: Performed by: OTOLARYNGOLOGY

## 2024-04-29 PROCEDURE — 2500000005 HC RX 250 GENERAL PHARMACY W/O HCPCS: Performed by: OTOLARYNGOLOGY

## 2024-04-29 RX ORDER — ONDANSETRON HYDROCHLORIDE 2 MG/ML
INJECTION, SOLUTION INTRAVENOUS AS NEEDED
Status: DISCONTINUED | OUTPATIENT
Start: 2024-04-29 | End: 2024-04-29

## 2024-04-29 RX ORDER — SODIUM CHLORIDE 0.9 G/100ML
IRRIGANT IRRIGATION AS NEEDED
Status: DISCONTINUED | OUTPATIENT
Start: 2024-04-29 | End: 2024-04-29 | Stop reason: HOSPADM

## 2024-04-29 RX ORDER — SODIUM CHLORIDE, SODIUM LACTATE, POTASSIUM CHLORIDE, CALCIUM CHLORIDE 600; 310; 30; 20 MG/100ML; MG/100ML; MG/100ML; MG/100ML
100 INJECTION, SOLUTION INTRAVENOUS CONTINUOUS
Status: DISCONTINUED | OUTPATIENT
Start: 2024-04-29 | End: 2024-04-29 | Stop reason: HOSPADM

## 2024-04-29 RX ORDER — LIDOCAINE HYDROCHLORIDE AND EPINEPHRINE 10; 10 MG/ML; UG/ML
INJECTION, SOLUTION INFILTRATION; PERINEURAL AS NEEDED
Status: DISCONTINUED | OUTPATIENT
Start: 2024-04-29 | End: 2024-04-29 | Stop reason: HOSPADM

## 2024-04-29 RX ORDER — BACITRACIN ZINC 500 UNIT/G
OINTMENT IN PACKET (EA) TOPICAL AS NEEDED
Status: DISCONTINUED | OUTPATIENT
Start: 2024-04-29 | End: 2024-04-29 | Stop reason: HOSPADM

## 2024-04-29 RX ORDER — ROCURONIUM BROMIDE 10 MG/ML
INJECTION, SOLUTION INTRAVENOUS AS NEEDED
Status: DISCONTINUED | OUTPATIENT
Start: 2024-04-29 | End: 2024-04-29

## 2024-04-29 RX ORDER — DOXYCYCLINE 100 MG/1
100 CAPSULE ORAL 2 TIMES DAILY
Qty: 20 CAPSULE | Refills: 0 | Status: SHIPPED | OUTPATIENT
Start: 2024-04-29 | End: 2024-05-09

## 2024-04-29 RX ORDER — OXYMETAZOLINE HCL 0.05 %
SPRAY, NON-AEROSOL (ML) NASAL AS NEEDED
Status: DISCONTINUED | OUTPATIENT
Start: 2024-04-29 | End: 2024-04-29 | Stop reason: HOSPADM

## 2024-04-29 RX ORDER — PROPOFOL 10 MG/ML
INJECTION, EMULSION INTRAVENOUS AS NEEDED
Status: DISCONTINUED | OUTPATIENT
Start: 2024-04-29 | End: 2024-04-29

## 2024-04-29 RX ORDER — HYDROMORPHONE HYDROCHLORIDE 1 MG/ML
INJECTION, SOLUTION INTRAMUSCULAR; INTRAVENOUS; SUBCUTANEOUS AS NEEDED
Status: DISCONTINUED | OUTPATIENT
Start: 2024-04-29 | End: 2024-04-29

## 2024-04-29 RX ORDER — CEFAZOLIN 1 G/1
INJECTION, POWDER, FOR SOLUTION INTRAVENOUS AS NEEDED
Status: DISCONTINUED | OUTPATIENT
Start: 2024-04-29 | End: 2024-04-29

## 2024-04-29 RX ORDER — ACETAMINOPHEN 325 MG/1
650 TABLET ORAL EVERY 4 HOURS PRN
Status: DISCONTINUED | OUTPATIENT
Start: 2024-04-29 | End: 2024-04-29 | Stop reason: HOSPADM

## 2024-04-29 RX ORDER — LIDOCAINE IN NACL,ISO-OSMOT/PF 30 MG/3 ML
0.1 SYRINGE (ML) INJECTION ONCE
Status: DISCONTINUED | OUTPATIENT
Start: 2024-04-29 | End: 2024-04-29 | Stop reason: HOSPADM

## 2024-04-29 RX ORDER — WATER 1 ML/ML
IRRIGANT IRRIGATION AS NEEDED
Status: DISCONTINUED | OUTPATIENT
Start: 2024-04-29 | End: 2024-04-29 | Stop reason: HOSPADM

## 2024-04-29 RX ADMIN — HYDROMORPHONE HYDROCHLORIDE 0.2 MG: 0.2 INJECTION, SOLUTION INTRAMUSCULAR; INTRAVENOUS; SUBCUTANEOUS at 09:30

## 2024-04-29 RX ADMIN — HYDROMORPHONE HYDROCHLORIDE 0.2 MG: 0.2 INJECTION, SOLUTION INTRAMUSCULAR; INTRAVENOUS; SUBCUTANEOUS at 09:37

## 2024-04-29 RX ADMIN — ONDANSETRON 4 MG: 2 INJECTION INTRAMUSCULAR; INTRAVENOUS at 08:44

## 2024-04-29 RX ADMIN — SUGAMMADEX 200 MG: 100 INJECTION, SOLUTION INTRAVENOUS at 08:44

## 2024-04-29 RX ADMIN — ROCURONIUM BROMIDE 40 MG: 50 INJECTION INTRAVENOUS at 07:47

## 2024-04-29 RX ADMIN — HYDROMORPHONE HYDROCHLORIDE 0.5 MG: 1 INJECTION, SOLUTION INTRAMUSCULAR; INTRAVENOUS; SUBCUTANEOUS at 08:44

## 2024-04-29 RX ADMIN — HYDROMORPHONE HYDROCHLORIDE 0.5 MG: 1 INJECTION, SOLUTION INTRAMUSCULAR; INTRAVENOUS; SUBCUTANEOUS at 07:47

## 2024-04-29 RX ADMIN — DEXAMETHASONE SODIUM PHOSPHATE 8 MG: 4 INJECTION, SOLUTION INTRAMUSCULAR; INTRAVENOUS at 07:57

## 2024-04-29 RX ADMIN — PROPOFOL 120 MG: 10 INJECTION, EMULSION INTRAVENOUS at 07:47

## 2024-04-29 RX ADMIN — CEFAZOLIN 2 G: 1 INJECTION, POWDER, FOR SOLUTION INTRAMUSCULAR; INTRAVENOUS at 07:57

## 2024-04-29 RX ADMIN — SODIUM CHLORIDE, POTASSIUM CHLORIDE, SODIUM LACTATE AND CALCIUM CHLORIDE 100 ML/HR: 600; 310; 30; 20 INJECTION, SOLUTION INTRAVENOUS at 07:00

## 2024-04-29 SDOH — HEALTH STABILITY: MENTAL HEALTH: CURRENT SMOKER: 0

## 2024-04-29 ASSESSMENT — PAIN - FUNCTIONAL ASSESSMENT
PAIN_FUNCTIONAL_ASSESSMENT: 0-10

## 2024-04-29 ASSESSMENT — PAIN SCALES - GENERAL
PAINLEVEL_OUTOF10: 4
PAINLEVEL_OUTOF10: 4
PAINLEVEL_OUTOF10: 0 - NO PAIN
PAINLEVEL_OUTOF10: 0 - NO PAIN
PAINLEVEL_OUTOF10: 2

## 2024-04-29 ASSESSMENT — COLUMBIA-SUICIDE SEVERITY RATING SCALE - C-SSRS
6. HAVE YOU EVER DONE ANYTHING, STARTED TO DO ANYTHING, OR PREPARED TO DO ANYTHING TO END YOUR LIFE?: NO
2. HAVE YOU ACTUALLY HAD ANY THOUGHTS OF KILLING YOURSELF?: NO
1. IN THE PAST MONTH, HAVE YOU WISHED YOU WERE DEAD OR WISHED YOU COULD GO TO SLEEP AND NOT WAKE UP?: NO

## 2024-04-29 NOTE — ANESTHESIA PROCEDURE NOTES
Airway  Date/Time: 4/29/2024 7:52 AM  Urgency: elective    Airway not difficult    Staffing  Performed: resident   Authorized by: Mae Shrestha MD MPH    Performed by: Brenton Chawla MD  Patient location during procedure: OR    Indications and Patient Condition  Indications for airway management: anesthesia  Sedation level: deep  Preoxygenated: yes  Patient position: sniffing  Mask difficulty assessment: 1 - vent by mask    Final Airway Details  Final airway type: endotracheal airway      Successful airway: ETT     Successful intubation technique: direct laryngoscopy  Endotracheal tube insertion site: oral  Blade: Nolan  Blade size: #3  ETT size (mm): 7.0  Cormack-Lehane Classification: grade I - full view of glottis  Placement verified by: chest auscultation and capnometry   Measured from: lips  Number of attempts at approach: 1  Ventilation between attempts: BVM

## 2024-04-29 NOTE — DISCHARGE INSTRUCTIONS
Deejay Pierce MD, M.Eng.  [facility]  Department of Otolaryngology-Head & Neck Surgery  Division of Rhinology and Endoscopic Skull Base Surgery  [email or web address]    PHONE NUMBERS:    Emergency: Call 911     Urgent Issues/After Hours: Call [phone] and ask to speak to the ENT resident on-call for Otolaryngology/Dr. Pierce    For regular, routine issues, feel free to call us at the office    WHAT TO DO     You need to follow-up with me at my outpatient clinic on the date provided to you.    Please call us to change date or time. Please keep in mind that the timing of your post-operative visit may affect the success of your surgery.    Call ASAP if you are experiencing any unexpected problems.    Familiarize yourself with these instructions. These instructions should replace any instructions given to you by the hospital. If you have questions, please call me.    Use the recommended medications and treatments as described.    Do not blow your nose until I say it is OK.     Call me with questions.  Also, please call me the day after surgery to let me know how you are doing.    WHAT TO EXPECT    Nasal Discharge & Bleeding  It is common to have mild bleeding and nasal drainage after nasal & sinus surgery.  After surgery, a “drip pad” may have been placed under your nose.  You may remove this at any point and can replace it if necessary, at your discretion.    Pain & Pressure  It is common to experience mild-to-moderate pain and pressure in your face and around your eyes. The pain usually is worst the first day after surgery but can continue for several days. Use your pain medications as described below. For any severe pain uncontrolled by medications, please contact the office or present to the emergency room.    Fatigue  It is common to feel mild to moderate fatigue for 7-10 days after surgery.    Nasal Congestion and Crusting    It is common to  experience worsened nasal congestion after surgery.  This is due to swelling and crusts.  Crusts are essentially scabs and mucus that build up in the nasal passages after surgery. Crusts eventually resolve.  Usually, I will remove some crusts during your postoperative visit.    Nasal irrigation helps to soften and remove scabs. We will tell you on the day of surgery when you can start irrigation    Nasal irrigation kits are available over the counter in the nasal section.  Common brands include SinuCleanse or NeilMed.    ACTIVITY     First 1-2 days after surgery: Plan to rest, but you may start light activities as tolerated.  Days 2 through 10 following surgery:  Light activity only until 10 days after surgery, gradually increase activity.    No extensive bending over for 7 days after surgery.    No lifting over 20 pounds for one week after surgery. No aerobic exercise until I clear you to do so.    You may shower starting 1 day after surgery    Call my office if you experience any of the following:    · Fever higher than 101 F  · Clear, watery nasal discharge  · Any visual changes or marked swelling around the eyes  · Severe headache or neck stiffness  · Brisk bleeding    NUTRITION     Day of surgery    Drink plenty of water / fluids  Eat light snacks as tolerated  It is common for people to have less of an appetite the day of surgery  Day after surgery: Advance your diet as tolerated    MEDICATION SAFETY TIPS    Use medications only as directed in the amounts specified  Avoid aspirin for 10 days.  Avoid fish oil (omega-3/6) and vitamin E for 1 week.  As your pain lessens, you may replace doses of prescription pain medications with acetaminophen (Tylenol).  However, if prescribed Lortab or Percocet (containing acetaminophen), Do Not take doses of prescription pain medications at the same time as Tylenol because this could cause an overdose of Tylenol.  Do Not drive or operate machinery if taking prescription pain  medications  Read each medication label carefully, ask your pharmacist if you have any questions regarding warnings on the label  Do not measure liquid with a kitchen spoon.  There are pediatric measuring devices available at the pharmacy.  Ask for one when you get your prescription filled.   Store all mediations out of reach of children.  Call the Poison Control Center if you or your child takes too much of any medicine or if your child consumes your medication [phone].    Revised 6/2022

## 2024-04-29 NOTE — ANESTHESIA POSTPROCEDURE EVALUATION
Patient: Luis Ann    Procedure Summary       Date: 04/29/24 [facility]    Anesthesia Start: 0746 Anesthesia Stop: 0910    Procedures:       Bilateral endoscopic sinus surgery with IGS. (Bilateral)      Navigation-Assisted Surgery Diagnosis:       Chronic sphenoidal sinusitis      Nasal lesion      (Chronic sphenoidal sinusitis [J32.3])      (Nasal lesion [J34.89])    Surgeons: Deejay KIRKPATRICK MD Responsible Provider: Mae Shrestha MD MPH    Anesthesia Type: general ASA Status: 3            Anesthesia Type: general    Vitals Value Taken Time   /93 04/29/24 0916   Temp 36.7 °C (98.1 °F) 04/29/24 0901   Pulse 80 04/29/24 0916   Resp 16 04/29/24 0916   SpO2 98 % 04/29/24 0916       Anesthesia Post Evaluation    Patient location during evaluation: PACU  Patient participation: complete - patient participated  Level of consciousness: sleepy but conscious  Pain management: adequate  Airway patency: patent  Cardiovascular status: acceptable  Respiratory status: acceptable  Hydration status: acceptable  Postoperative Nausea and Vomiting: none      No notable events documented.

## 2024-04-29 NOTE — OP NOTE
Bilateral endoscopic sinus surgery with IGS. (B), Navigation-Assisted Surgery Operative Note     Date: 2024  OR Location: [facility]    Name: Luis Ann, : 1945, Age: 78 y.o., MRN: [id number], Sex: female    Diagnosis  Pre-op Diagnosis     * Chronic sphenoidal sinusitis [J32.3]     * Nasal lesion [J34.89] Post-op Diagnosis     * Chronic sphenoidal sinusitis [J32.3]     * Nasal lesion [J34.89]     Procedures  Bilateral endoscopic sinus surgery with IGS.  09177 - [place] SEPTOPLASTY/SUBMUCOUS RESECJ W/WO CARTILAGE GRF    Navigation-Assisted Surgery  09351 - [place] STRTCTC CPTR ASSTD PX EXTRADURAL CRANIAL    [place] NASAL/SINUS ENDOSCOPY W/SPHENOIDOTOMY [23486]  Surgeons      * Deejay Pierce V - Primary    Resident/Fellow/Other Assistant:  Surgeons and Role:  * No surgeons found with a matching role *    Procedure Summary  Anesthesia: General  ASA: III  Anesthesia Staff: Anesthesiologist: Mae Shrestha MD MPH  Anesthesia Resident: Brenton Chawla MD  Estimated Blood Loss: 10mL  Intra-op Medications:   Administrations occurring from 0730 to 0930 on 24:   Medication Name Total Dose   lidocaine-epinephrine (Xylocaine W/EPI) 1 %-1:100,000 injection 1 mL   oxymetazoline (Afrin) 0.05 % nasal spray 5 spray   sterile water irrigation solution 500 mL   bacitracin ointment 1 Application   lactated Ringer's infusion Cannot be calculated              Anesthesia Record               Intraprocedure I/O Totals          Intake    lactated Ringer's infusion 500.00 mL    Total Intake 500 mL          Specimen:   ID Type Source Tests Collected by Time   1 : left septum lesion Tissue NASAL SEPTAL TISSUE SURGICAL PATHOLOGY EXAM Deejay KIRKPATRICK MD 2024 0810   2 : left sphenoid sinus lesion Tissue SINUS RESECTION LEFT SURGICAL PATHOLOGY EXAM Deejay KIRKPATRICK MD 2024 0826   A : left sphenoid sinus Swab NASAL/SINONASAL LEFT CONTENTS FUNGAL CULTURE/SMEAR Deejay KIRKPATRICK MD 2024 0846        Staff:    Circulator: Mindi Nogueira RN  Scrub Person: Gopi Salas RN         Drains and/or Catheters: * None in log *    Findings: Left septal lesion sent for permanent, copious thick yellow/green debris in left sphenoid sinus    Indications: Luis Ann is an 78 y.o. female who is having surgery for Chronic sphenoidal sinusitis [J32.3]  Nasal lesion [J34.89].     The patient was seen in the preoperative area. The risks, benefits, complications, treatment options, non-operative alternatives, expected recovery and outcomes were discussed with the patient. The possibilities of reaction to medication, pulmonary aspiration, injury to surrounding structures, bleeding, recurrent infection, the need for additional procedures, failure to diagnose a condition, and creating a complication requiring transfusion or operation were discussed with the patient. The patient concurred with the proposed plan, giving informed consent.  The site of surgery was properly noted/marked if necessary per policy. The patient has been actively warmed in preoperative area. Preoperative antibiotics have been ordered and given within 1 hours of incision. Venous thrombosis prophylaxis have been ordered including bilateral sequential compression devices    Procedure Details: The patient was brought to the operating room and laid supine on the operating table.  Smooth induction of general endotracheal anesthesia was performed.  The bed was rotated 90 degrees.  The patient's nose was decongested with oxymetazoline.  The Perfect image guidance system was registered to the patient's facial landmarks using her preoperative imaging.  It was noted to be functioning within 2 mm of accuracy throughout the case and was used to identify pertinent neurovascular and bony landmarks.  A timeout was performed which correct patient procedure and other pertinent information were confirmed with the operating staff.  To begin to utilize a 0 degree endoscope to  visualize the patient's bilateral sinonasal cavity.  On the left side there is a fleshy pink lesion on the anterior septum adjacent to the nasal floor.  I injected 1% lidocaine with 1 100,000 parts epinephrine into the surrounding mucosa.  I then used a 15 blade to incise the mucosa of the septum and dissected a submucoperichondrial plane from anterior to posterior around the lesion.  I then resected the lesion.  It did appear to be attached to a portion of this anterior septal cartilage so I did remove back cartilage.  There was no septal perforation or other obvious abnormalities.  The mucosa surrounding the lesion was fairly hyperemic and bled easily so I did cauterize the edges of the resection margin taking care to not allow a through and through defect to be created.  I then outfracture the inferior turbinate and middle turbinate and the superior turbinates and I exposed the sphenoid sinus natural ostium and sphenoethmoidal recess.  The region was quite edematous.  I opened the sphenoid sinus and immediately visible was copious amounts of thick yellowish to greenish debris consistent with mycetoma versus insufflated infection.  I thoroughly and meticulously debrided all of this material and then thoroughly suctioned and irrigated the sphenoid sinus until it was completely clean.  I then medialized the middle and superior turbinates.  To protect at the site of the anterior septal resection I placed a small piece of Surgicel followed by modified London splints and affixed them to the septum with 4-0 Prolene suture.  This completed the procedure.  All counts are correct x 2 and I was present and actively participated in the entire procedure..  Complications:  None; patient tolerated the procedure well.    Disposition: PACU - hemodynamically stable.  Condition: stable       I was present and scrubbed for the entire procedure.    Deejay Pierce V  Phone Number: [phone]

## 2024-04-29 NOTE — ANESTHESIA PREPROCEDURE EVALUATION
Patient: Luis Ann    Procedure Information       Date/Time: 04/29/24 0730    Procedures:       Bilateral endoscopic sinus surgery with IGS. 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)      GI (within normal limits)      /Renal   (+) Prostate cancer (Multi)      Liver (within normal limits)      Endocrine (within normal limits)      Hematology (within normal limits)      HEENT   (+) Chronic sphenoidal sinusitis       Clinical information reviewed:   Tobacco  Allergies  Meds   Med Hx  Surg Hx   Fam Hx  Soc Hx        NPO Detail:  NPO/Void Status  Date of Last Liquid: 04/29/24  Time of Last Liquid: 0500  Date of Last Solid: 04/28/24  Time of Last Solid: 2000         Vitals:    04/29/24 0653   BP: 173/80   Pulse: 67   Resp: 16   Temp: 36.6 °C (97.9 °F)   SpO2: 95%       Physical Exam    Airway  Mallampati: III  TM distance: >3 FB  Neck ROM: full     Cardiovascular   Rhythm: regular  Rate: normal     Dental - normal exam     Pulmonary   Breath sounds clear to auscultation     Abdominal            Anesthesia Plan    History of general anesthesia?: yes  History of complications of general anesthesia?: no    ASA 3     general     The patient is not a current smoker.  Patient was not previously instructed to abstain from smoking on day of procedure.  Patient did not smoke on day of procedure.    intravenous induction   Postoperative administration of opioids is intended.  Trial extubation is planned.  Anesthetic plan and risks discussed with patient.  Use of  blood products discussed with patient who.    Plan discussed with attending and resident.

## 2024-05-08 ENCOUNTER — OFFICE VISIT (OUTPATIENT)
Dept: OTOLARYNGOLOGY | Facility: CLINIC | Age: 79
End: 2024-05-08
Payer: MEDICARE

## 2024-05-08 VITALS — TEMPERATURE: 98 F

## 2024-05-08 DIAGNOSIS — J32.3 CHRONIC SPHENOIDAL SINUSITIS: Primary | ICD-10-CM

## 2024-05-08 LAB
LABORATORY COMMENT REPORT: NORMAL
PATH REPORT.FINAL DX SPEC: NORMAL
PATH REPORT.GROSS SPEC: NORMAL
PATH REPORT.RELEVANT HX SPEC: NORMAL
PATH REPORT.TOTAL CANCER: NORMAL

## 2024-05-08 PROCEDURE — 1159F MED LIST DOCD IN RCRD: CPT | Performed by: OTOLARYNGOLOGY

## 2024-05-08 PROCEDURE — 99024 POSTOP FOLLOW-UP VISIT: CPT | Performed by: OTOLARYNGOLOGY

## 2024-05-08 PROCEDURE — 1157F ADVNC CARE PLAN IN RCRD: CPT | Performed by: OTOLARYNGOLOGY

## 2024-05-08 PROCEDURE — 31231 NASAL ENDOSCOPY DX: CPT | Performed by: OTOLARYNGOLOGY

## 2024-05-08 PROCEDURE — 1160F RVW MEDS BY RX/DR IN RCRD: CPT | Performed by: OTOLARYNGOLOGY

## 2024-05-13 ENCOUNTER — TELEPHONE (OUTPATIENT)
Dept: SCHEDULING | Age: 79
End: 2024-05-13
Payer: MEDICARE

## 2024-05-20 LAB
FUNGUS SPEC CULT: NORMAL
FUNGUS SPEC FUNGUS STN: NORMAL

## 2024-05-22 ENCOUNTER — OFFICE VISIT (OUTPATIENT)
Dept: OTOLARYNGOLOGY | Facility: CLINIC | Age: 79
End: 2024-05-22
Payer: MEDICARE

## 2024-05-22 VITALS — WEIGHT: 165 LBS | BODY MASS INDEX: 24.44 KG/M2 | HEIGHT: 69 IN

## 2024-05-22 DIAGNOSIS — J32.3 CHRONIC SPHENOIDAL SINUSITIS: ICD-10-CM

## 2024-05-22 DIAGNOSIS — J32.8 OTHER CHRONIC SINUSITIS: Primary | ICD-10-CM

## 2024-05-22 PROCEDURE — 99024 POSTOP FOLLOW-UP VISIT: CPT | Performed by: OTOLARYNGOLOGY

## 2024-05-22 PROCEDURE — 1159F MED LIST DOCD IN RCRD: CPT | Performed by: OTOLARYNGOLOGY

## 2024-05-22 PROCEDURE — 1160F RVW MEDS BY RX/DR IN RCRD: CPT | Performed by: OTOLARYNGOLOGY

## 2024-05-22 PROCEDURE — 1157F ADVNC CARE PLAN IN RCRD: CPT | Performed by: OTOLARYNGOLOGY

## 2024-05-22 PROCEDURE — 1036F TOBACCO NON-USER: CPT | Performed by: OTOLARYNGOLOGY

## 2024-05-22 PROCEDURE — 31237 NSL/SINS NDSC SURG BX POLYPC: CPT | Performed by: OTOLARYNGOLOGY

## 2024-05-22 NOTE — PROGRESS NOTES
"Chief Complaint:  Follow-up after endoscopic sinus surgery    Referring Provider: No ref. provider found    History of Present Illness:    Luis Ann is a 78 y.o. female, presenting for follow-up after endoscopic sinus surgery for left-sided chronic sphenoid sinusitis.  She is felt better each day since surgery.  She is overall feeling well.  No restrictions on her activity currently.  She is not performing any sinus irrigations.  ?  Review of Systems:    Review of symptoms was negative except for those stated including Cardiopulmonary, Genitourinary, Gastrointestinal, Psychological, Sleep pattern, Endocrine, Eyes, Neurologic, Musculoskeletal, Skin, Hematologic/Lymphatic and Allergic/Immunologic.     Medical History:    I have reviewed the patient's updated past medical history, surgical history, family history, social history, as well as current medications and allergies as of 5/22/2024. Changes to these items have been updated and marked as reviewed in the electronic medical record.    Physical Exam:    Vitals:  height is 1.753 m (5' 9\") and weight is 74.8 kg (165 lb).   General: Patient doing well overall and is in no apparent distress.  Psych: Pleasant affect, and answers questions appropriately.  Head & Face: Symmetric facial movements  Eyes: Pupils equal, round, reactive.  Extraocular movements intact without gaze restrictions or nystagmus. No epiphora.  Ears:  External auditory canals are normal.  Tympanic membranes are clear.  No middle ear effusion is seen.  All middle ear landmarks are normal.  Nose: Anterior rhinoscopy revealed normal sinonasal mucosa. More posterior areas of the nasal cavity could not be completely examined.  Oral Cavity/Oropharynx:  Without lesions or masses to visual exam.  Neck: Supple without lymphadenopathy.  Lungs: Non-labored, and without evidence of stridor.  Cardiac: Pulses are strong, well-perfused.  Extremities: Without gross evidence of clubbing, cyanosis, or " edema.  Neuro: Cranial nerves II-XII grossly intact; Intact facial movements.    Procedure:  Sinonasal cavity debridement (98011)  Pre-procedure diagnosis: Sinonasal crusting/obstruction  Post-procedure diagnosis:  Sinonasal crusting/obstruction  Indication:  Remove nasal crusting, prevent synechiae    Verbal consent was obtained after informed discussion    Topical anesthetic was applied with a combination of 4% lidocaine spray and oxymetazoline. A 0 and 30-degree endoscope was used throughout the procedure.    Blood clots, debris, eschar and nasal crusting were removed from the sphenoid sinus.     Findings:  I debrided and removed some retained crusting and exudative material from the sphenoid sinus on the left side.  A small amount of material was left in place due to patient discomfort.    The patient tolerated the procedure well.      Assessment:     Luis Ann is a 78 y.o. female with chronic sphenoid sinus disease status post endoscopic sinus surgery.    Plan:      Luis is doing well.  I asked her to continue using saline rinses I do think she needs to follow-up once more in the next 2 to 3 weeks to undergo 1 last debridements within the left sphenoid sinus.  I may be out of town at the beginning of June so I will have her follow-up with my colleague Dr. Sunil Pierce MD, M.Eng.   of Otolaryngology - Head & Neck Surgery  Division of Rhinology and Endoscopic Skull Base Surgery  [facility]

## 2024-05-24 NOTE — PROGRESS NOTES
Chief Complaint:  Post-Op    Referring Provider: No ref. provider found    History of Present Illness:    Luis Ann is a 78 y.o. female, presenting for postoperative evaluation after undergoing endoscopic sinus surgery.  She has been doing fairly well.  She is starting to return to her normal activity.  No significant issues.  Her headaches have resolved.  ?  Review of Systems:    Review of symptoms was negative except for those stated including Cardiopulmonary, Genitourinary, Gastrointestinal, Psychological, Sleep pattern, Endocrine, Eyes, Neurologic, Musculoskeletal, Skin, Hematologic/Lymphatic and Allergic/Immunologic.     Medical History:    I have reviewed the patient's updated past medical history, surgical history, family history, social history, as well as current medications and allergies as of 5/8/2024. Changes to these items have been updated and marked as reviewed in the electronic medical record.    Physical Exam:    Vitals:  temperature is 36.7 °C (98 °F).   General: Patient doing well overall and is in no apparent distress.  Psych: Pleasant affect, and answers questions appropriately.  Head & Face: Symmetric facial movements  Eyes: Pupils equal, round, reactive.  Extraocular movements intact without gaze restrictions or nystagmus. No epiphora.  Ears:  External auditory canals are normal.  Tympanic membranes are clear.  No middle ear effusion is seen.  All middle ear landmarks are normal.  Nose: Anterior rhinoscopy revealed normal sinonasal mucosa. More posterior areas of the nasal cavity could not be completely examined.  Oral Cavity/Oropharynx:  Without lesions or masses to visual exam.  Neck: Supple without lymphadenopathy.  Lungs: Non-labored, and without evidence of stridor.  Cardiac: Pulses are strong, well-perfused.  Extremities: Without gross evidence of clubbing, cyanosis, or edema.  Neuro: Cranial nerves II-XII grossly intact; Intact facial movements.      Procedure:  Sinonasal cavity  debridement (85412)  Pre-procedure diagnosis: Sinonasal crusting/obstruction  Post-procedure diagnosis:  Sinonasal crusting/obstruction  Indication:  Remove nasal crusting, prevent synechiae    Verbal consent was obtained after informed discussion    Topical anesthetic was applied with a combination of 4% lidocaine spray and oxymetazoline. A 0 and 30-degree endoscope was used throughout the procedure.    Blood clots, debris, eschar and nasal crusting were removed from the middle meatus, maxillary sinus and ethmoid cavity.  A combination of straight and curved suctions, as well as grasping forceps were utilized.      Findings:  No significant obstructing crusting retained blood products or NasoPore.  Her mucosa is still healing and there is some areas that are edematous.  No significant obstructing mucus within the nasopharynx.  She tolerated the procedure well.    The patient tolerated the procedure well.    Assessment:     Luis Ann is a 78 y.o. female with chronic sinusitis status post endoscopic sinus surgery.  She is doing well.    Plan:      Continue to irrigate and follow-up with me in 2 weeks or sooner if needed      Deejay Pierce MD, M.Eng.   of Otolaryngology - Head & Neck Surgery  Division of Rhinology and Endoscopic Skull Base Surgery  [facility]

## 2024-06-05 ENCOUNTER — OFFICE VISIT (OUTPATIENT)
Dept: OTOLARYNGOLOGY | Facility: CLINIC | Age: 79
End: 2024-06-05
Payer: MEDICARE

## 2024-06-05 VITALS — WEIGHT: 165 LBS | BODY MASS INDEX: 24.44 KG/M2 | HEIGHT: 69 IN

## 2024-06-05 DIAGNOSIS — J34.89 NASAL AND SINUS DISCHARGE: Primary | ICD-10-CM

## 2024-06-05 DIAGNOSIS — J34.89 NASAL LESION: ICD-10-CM

## 2024-06-05 DIAGNOSIS — J32.3 CHRONIC SPHENOIDAL SINUSITIS: ICD-10-CM

## 2024-06-05 PROCEDURE — 31231 NASAL ENDOSCOPY DX: CPT | Performed by: OTOLARYNGOLOGY

## 2024-06-05 PROCEDURE — 1159F MED LIST DOCD IN RCRD: CPT | Performed by: OTOLARYNGOLOGY

## 2024-06-05 PROCEDURE — 99024 POSTOP FOLLOW-UP VISIT: CPT | Performed by: OTOLARYNGOLOGY

## 2024-06-05 PROCEDURE — 1157F ADVNC CARE PLAN IN RCRD: CPT | Performed by: OTOLARYNGOLOGY

## 2024-06-05 PROCEDURE — 1036F TOBACCO NON-USER: CPT | Performed by: OTOLARYNGOLOGY

## 2024-06-05 PROCEDURE — 1160F RVW MEDS BY RX/DR IN RCRD: CPT | Performed by: OTOLARYNGOLOGY

## 2024-06-05 RX ORDER — AZELASTINE 1 MG/ML
2 SPRAY, METERED NASAL 2 TIMES DAILY
Qty: 30 ML | Refills: 3 | Status: SHIPPED | OUTPATIENT
Start: 2024-06-05 | End: 2024-07-05

## 2024-06-05 ASSESSMENT — PATIENT HEALTH QUESTIONNAIRE - PHQ9
SUM OF ALL RESPONSES TO PHQ9 QUESTIONS 1 AND 2: 0
2. FEELING DOWN, DEPRESSED OR HOPELESS: NOT AT ALL
1. LITTLE INTEREST OR PLEASURE IN DOING THINGS: NOT AT ALL

## 2024-06-06 NOTE — PROGRESS NOTES
"Chief Complaint:  Follow-up after endoscopic sinus surgery    Referring Provider: No ref. provider found    History of Present Illness:    Luis Ann is a 78 y.o. female, presenting for follow-up after endoscopic sinus surgery for left-sided chronic sphenoid sinusitis.  No new issues. She has been irrigating. No headaches.?  Review of Systems:    Review of symptoms was negative except for those stated including Cardiopulmonary, Genitourinary, Gastrointestinal, Psychological, Sleep pattern, Endocrine, Eyes, Neurologic, Musculoskeletal, Skin, Hematologic/Lymphatic and Allergic/Immunologic.     Medical History:    I have reviewed the patient's updated past medical history, surgical history, family history, social history, as well as current medications and allergies as of 6/5/2024. Changes to these items have been updated and marked as reviewed in the electronic medical record.    Physical Exam:    Vitals:  height is 1.753 m (5' 9\") and weight is 74.8 kg (165 lb).   General: Patient doing well overall and is in no apparent distress.  Psych: Pleasant affect, and answers questions appropriately.  Head & Face: Symmetric facial movements  Eyes: Pupils equal, round, reactive.  Extraocular movements intact without gaze restrictions or nystagmus. No epiphora.  Ears:  External auditory canals are normal.  Tympanic membranes are clear.  No middle ear effusion is seen.  All middle ear landmarks are normal.  Nose: Anterior rhinoscopy revealed normal sinonasal mucosa. More posterior areas of the nasal cavity could not be completely examined.  Oral Cavity/Oropharynx:  Without lesions or masses to visual exam.  Neck: Supple without lymphadenopathy.  Lungs: Non-labored, and without evidence of stridor.  Cardiac: Pulses are strong, well-perfused.  Extremities: Without gross evidence of clubbing, cyanosis, or edema.  Neuro: Cranial nerves II-XII grossly intact; Intact facial movements.    Procedure:  Rigid nasal endoscopy " (02590)  Pre-procedure diagnosis/Indication for procedure:  To evaluate areas not visualized on anterior rhinoscopy   Anesthesia:  None  Description:  A 0-degree 3-mm rigid nasal endoscope was used to examine the left and right nasal cavities.  The nasal valve areas were examined for abnormalities or collapse.  The inferior and middle turbinates were evaluated.  The middle and superior meatuses, and the sphenoethmoid recesses were examined and inspected for mucopurulence and polyps. Once the endoscope was withdrawn, the patient was noted to have tolerated the procedure well without complications and was returned to ambulatory status.    Findings:    Patent sphenoid sinus and sphenoethmoidal recess. No infection or mucopurulence. Some mild crusting of the anterior left nasal septum. No other findings.        Assessment:     Luis Ann is a 78 y.o. female with chronic sphenoid sinus disease status post endoscopic sinus surgery.    Plan:      Luis is doing well.  He sphenoid sinus has healed well and she denies any persistent headaches or discomfort. I recommend she continue to use saline rinses and follow up with me in 6 months or sooner if needed.     Deejay Pierce MD, M.Eng.   of Otolaryngology - Head & Neck Surgery  Division of Rhinology and Endoscopic Skull Base Surgery  [facility]

## 2024-07-01 ENCOUNTER — TELEPHONE (OUTPATIENT)
Dept: SCHEDULING | Age: 79
End: 2024-07-01
Payer: MEDICARE

## 2024-07-01 DIAGNOSIS — I10 PRIMARY HYPERTENSION: Primary | ICD-10-CM

## 2024-07-01 DIAGNOSIS — I25.10 CORONARY ARTERY DISEASE INVOLVING NATIVE CORONARY ARTERY OF NATIVE HEART WITHOUT ANGINA PECTORIS: ICD-10-CM

## 2024-07-08 ENCOUNTER — APPOINTMENT (OUTPATIENT)
Dept: CARDIOLOGY | Facility: HOSPITAL | Age: 79
End: 2024-07-08
Payer: MEDICARE

## 2024-07-08 ENCOUNTER — LAB (OUTPATIENT)
Dept: LAB | Facility: LAB | Age: 79
End: 2024-07-08
Payer: MEDICARE

## 2024-07-08 DIAGNOSIS — I10 PRIMARY HYPERTENSION: ICD-10-CM

## 2024-07-08 DIAGNOSIS — I25.10 CORONARY ARTERY DISEASE INVOLVING NATIVE CORONARY ARTERY OF NATIVE HEART WITHOUT ANGINA PECTORIS: ICD-10-CM

## 2024-07-08 PROCEDURE — 84460 ALANINE AMINO (ALT) (SGPT): CPT

## 2024-07-08 PROCEDURE — 36415 COLL VENOUS BLD VENIPUNCTURE: CPT

## 2024-07-08 PROCEDURE — 80061 LIPID PANEL: CPT

## 2024-07-08 PROCEDURE — 84450 TRANSFERASE (AST) (SGOT): CPT

## 2024-07-09 LAB
ALT SERPL W P-5'-P-CCNC: 14 U/L (ref 7–45)
AST SERPL W P-5'-P-CCNC: 12 U/L (ref 9–39)
CHOLEST SERPL-MCNC: 154 MG/DL (ref 0–199)
CHOLESTEROL/HDL RATIO: 3
HDLC SERPL-MCNC: 50.6 MG/DL
LDLC SERPL CALC-MCNC: 67 MG/DL
NON HDL CHOLESTEROL: 103 MG/DL (ref 0–149)
TRIGL SERPL-MCNC: 182 MG/DL (ref 0–149)
VLDL: 36 MG/DL (ref 0–40)

## 2024-07-12 NOTE — RESULT ENCOUNTER NOTE
LDL is at target LDL < 70 on atorvastatin 40 mg daily and zetia. Her triglycerides are elevated. Should discuss that low carbohydrate diet would improve her triglycerides. On her current dose of atorvastatin her liver function tests remain normal. NO change in therapy at this time. Will reassess her triglycerides in 3-6 months to decide if will add fish oil /lovaza

## 2024-07-31 ENCOUNTER — OFFICE VISIT (OUTPATIENT)
Dept: PULMONOLOGY | Facility: CLINIC | Age: 79
End: 2024-07-31
Payer: MEDICARE

## 2024-07-31 VITALS
SYSTOLIC BLOOD PRESSURE: 140 MMHG | TEMPERATURE: 97.8 F | OXYGEN SATURATION: 94 % | DIASTOLIC BLOOD PRESSURE: 82 MMHG | HEART RATE: 66 BPM | RESPIRATION RATE: 18 BRPM

## 2024-07-31 DIAGNOSIS — J45.40 MODERATE PERSISTENT ASTHMA WITHOUT COMPLICATION (HHS-HCC): ICD-10-CM

## 2024-07-31 PROCEDURE — 1157F ADVNC CARE PLAN IN RCRD: CPT | Performed by: INTERNAL MEDICINE

## 2024-07-31 PROCEDURE — 1160F RVW MEDS BY RX/DR IN RCRD: CPT | Performed by: INTERNAL MEDICINE

## 2024-07-31 PROCEDURE — 1159F MED LIST DOCD IN RCRD: CPT | Performed by: INTERNAL MEDICINE

## 2024-07-31 PROCEDURE — 99214 OFFICE O/P EST MOD 30 MIN: CPT | Performed by: INTERNAL MEDICINE

## 2024-07-31 PROCEDURE — 1036F TOBACCO NON-USER: CPT | Performed by: INTERNAL MEDICINE

## 2024-07-31 PROCEDURE — 3079F DIAST BP 80-89 MM HG: CPT | Performed by: INTERNAL MEDICINE

## 2024-07-31 PROCEDURE — 3077F SYST BP >= 140 MM HG: CPT | Performed by: INTERNAL MEDICINE

## 2024-07-31 RX ORDER — BUDESONIDE AND FORMOTEROL FUMARATE DIHYDRATE 160; 4.5 UG/1; UG/1
2 AEROSOL RESPIRATORY (INHALATION)
Qty: 10.2 G | Refills: 11 | Status: SHIPPED | OUTPATIENT
Start: 2024-07-31 | End: 2025-01-27

## 2024-07-31 ASSESSMENT — ENCOUNTER SYMPTOMS
STRIDOR: 0
SPEECH DIFFICULTY: 0
RHINORRHEA: 0
CONSTIPATION: 0
ABDOMINAL DISTENTION: 0
FREQUENCY: 0
HEMATURIA: 0
DYSURIA: 0
EYE DISCHARGE: 0
SLEEP DISTURBANCE: 0
SINUS PAIN: 0
HEADACHES: 0
LIGHT-HEADEDNESS: 0
EYE REDNESS: 0
NAUSEA: 0
WEAKNESS: 0
FACIAL SWELLING: 0
DIFFICULTY URINATING: 0
NUMBNESS: 0
TREMORS: 0
DIZZINESS: 0
UNEXPECTED WEIGHT CHANGE: 0
WHEEZING: 1
SINUS PRESSURE: 0
PALPITATIONS: 0
FEVER: 0
ABDOMINAL PAIN: 0
APNEA: 0
FATIGUE: 0
NERVOUS/ANXIOUS: 0
CHOKING: 0
ADENOPATHY: 0
ARTHRALGIAS: 1
COUGH: 0
JOINT SWELLING: 0
BRUISES/BLEEDS EASILY: 0
AGITATION: 0
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
## 2024-07-31 NOTE — PATIENT INSTRUCTIONS
Would continue with the Symbicort two puffs twice a day and keep me posted how you are doing over the next month

## 2024-09-05 DIAGNOSIS — U07.1 COVID: Primary | ICD-10-CM

## 2024-09-05 NOTE — PROGRESS NOTES
Has acute covid again; with her drug interactions will call in Molnupiravir   she will keep me posted

## 2024-09-17 ENCOUNTER — TELEPHONE (OUTPATIENT)
Dept: SCHEDULING | Age: 79
End: 2024-09-17
Payer: MEDICARE

## 2024-09-18 ENCOUNTER — OFFICE VISIT (OUTPATIENT)
Dept: URGENT CARE | Age: 79
End: 2024-09-18
Payer: MEDICARE

## 2024-09-18 VITALS
HEART RATE: 70 BPM | DIASTOLIC BLOOD PRESSURE: 70 MMHG | OXYGEN SATURATION: 94 % | RESPIRATION RATE: 17 BRPM | BODY MASS INDEX: 22.15 KG/M2 | TEMPERATURE: 99.4 F | SYSTOLIC BLOOD PRESSURE: 129 MMHG | WEIGHT: 150 LBS

## 2024-09-18 DIAGNOSIS — U07.1 COVID-19: ICD-10-CM

## 2024-09-18 DIAGNOSIS — H92.03 OTALGIA OF BOTH EARS: ICD-10-CM

## 2024-09-18 DIAGNOSIS — J02.9 PHARYNGITIS, UNSPECIFIED ETIOLOGY: Primary | ICD-10-CM

## 2024-09-18 ASSESSMENT — PATIENT HEALTH QUESTIONNAIRE - PHQ9
2. FEELING DOWN, DEPRESSED OR HOPELESS: NOT AT ALL
1. LITTLE INTEREST OR PLEASURE IN DOING THINGS: NOT AT ALL
SUM OF ALL RESPONSES TO PHQ9 QUESTIONS 1 AND 2: 0

## 2024-09-18 NOTE — PROGRESS NOTES
HPI:  Patient is here with .  Pt states that 12 days ago she was diagnosed with Covid and took Molnupiravir.  Pt states that she feels better, but still has a ST and pain in both ears.  No cp or sob.   No n/v/diarrhea.      ROS:  No fever  +chills  No cp   no sob    PE:    A&O x3  NCAT  PERRLA, EOMI  TM clear bl, non-obstructing cerumen on the right  +posterior pharyngeal erythema w/o swelling or exudates  Sinus rhythm  Good air movement, now/r/r  MOEx4  No focal deficit  Judgement normal  No submandibular nodes    A/P:  Covid 19  Pharyngitis  BL otalgia    Your symptoms are secondary to Covid.  Increase fluids. Rest.  Multivitamins.  Tylenol as needed for pain/fever.  Gargle with warm water.  Keep a diary of symptoms.  Recheck with your doctor in 5 days if no improvement.  Go to the ER if starts getting worse.

## 2024-09-26 ENCOUNTER — OFFICE VISIT (OUTPATIENT)
Dept: CARDIOLOGY | Facility: HOSPITAL | Age: 79
End: 2024-09-26
Payer: MEDICARE

## 2024-09-26 VITALS
HEART RATE: 64 BPM | BODY MASS INDEX: 26.05 KG/M2 | SYSTOLIC BLOOD PRESSURE: 159 MMHG | HEIGHT: 69 IN | WEIGHT: 175.9 LBS | OXYGEN SATURATION: 98 % | DIASTOLIC BLOOD PRESSURE: 80 MMHG

## 2024-09-26 DIAGNOSIS — I25.10 CORONARY ARTERY DISEASE INVOLVING NATIVE CORONARY ARTERY OF NATIVE HEART WITHOUT ANGINA PECTORIS: Primary | ICD-10-CM

## 2024-09-26 DIAGNOSIS — I10 ESSENTIAL HYPERTENSION: ICD-10-CM

## 2024-09-26 DIAGNOSIS — E78.5 DYSLIPIDEMIA, GOAL LDL BELOW 70: ICD-10-CM

## 2024-09-26 LAB
ATRIAL RATE: 64 BPM
P AXIS: 71 DEGREES
P OFFSET: 178 MS
P ONSET: 117 MS
PR INTERVAL: 188 MS
Q ONSET: 211 MS
QRS COUNT: 11 BEATS
QRS DURATION: 98 MS
QT INTERVAL: 382 MS
QTC CALCULATION(BAZETT): 394 MS
QTC FREDERICIA: 390 MS
R AXIS: -47 DEGREES
T AXIS: 86 DEGREES
T OFFSET: 402 MS
VENTRICULAR RATE: 64 BPM

## 2024-09-26 PROCEDURE — 1159F MED LIST DOCD IN RCRD: CPT | Performed by: INTERNAL MEDICINE

## 2024-09-26 PROCEDURE — 99214 OFFICE O/P EST MOD 30 MIN: CPT | Performed by: INTERNAL MEDICINE

## 2024-09-26 PROCEDURE — 3077F SYST BP >= 140 MM HG: CPT | Performed by: INTERNAL MEDICINE

## 2024-09-26 PROCEDURE — 1036F TOBACCO NON-USER: CPT | Performed by: INTERNAL MEDICINE

## 2024-09-26 PROCEDURE — G2211 COMPLEX E/M VISIT ADD ON: HCPCS | Performed by: INTERNAL MEDICINE

## 2024-09-26 PROCEDURE — 93005 ELECTROCARDIOGRAM TRACING: CPT | Performed by: INTERNAL MEDICINE

## 2024-09-26 PROCEDURE — 1157F ADVNC CARE PLAN IN RCRD: CPT | Performed by: INTERNAL MEDICINE

## 2024-09-26 PROCEDURE — 3079F DIAST BP 80-89 MM HG: CPT | Performed by: INTERNAL MEDICINE

## 2024-09-26 PROCEDURE — 1160F RVW MEDS BY RX/DR IN RCRD: CPT | Performed by: INTERNAL MEDICINE

## 2024-09-26 NOTE — PATIENT INSTRUCTIONS
1. Coronary artery disease. Continue atorvastatin 40 mg and zetia (LDL at target 6/2023) and aspirin.  2. Dyspnea- Sarcoid of lung as per Dr Gonzales. Improved with inhaler and nebulizer. Currently no shortness of breath on exertion.  3. Hypertension- Continue the  losartan 100 mg and and clonidine 0.1 mg daily and amlodipine 5 mg daily. Check home BP and HR and log results for review at next visit. BP elevated today in office. Please start checking Bp at ome and log for review. Please call the office in 1 month to review home BP readings to see if medications need adjustment.  4. Hyperlipidemia- continue atorvastatin 40 mg daily and zetia 10 mg daily. Your LDL target is < 70.   5. Continue current level of exercise and continue working on exercises to improve leg strength.   Return to clinic in 3 months

## 2024-09-26 NOTE — PROGRESS NOTES
Primary Care Physician: Royer Gonzales MD MPH      Date of Visit: 09/26/2024 10:40 AM EDT  Location of visit: [facility]   Type of Visit: Established Patient     HPI / Summary:   Patient is a 79 year old woman with HTN, hyperlipidemia and known sarcoidosis ( lung involvement) with moderate airflow obstruction and mild restriction with moderate LAD stenosis by CTA with heart flow in 2017, later found to have significant stenosis of a small D1 by cCTA in 2022  to have medical therapy  who returns for follow up      CAD risk factors: + HTN, hyperlipidemia, no DM, no FHx CAD, never smoked  prior cardiac work up ( Reggie hicks 2017) :  + Stress EKG for SOB.   Aug 10 2017: dense calcified plaque in the proximal LAD with 50-70% stenosis. FFR was negative ( lowest value 0.82) Other vessels without significant stenoses.   Decision to pursue medical management.   Patient with h/o lung sarcoid- had significant shortness of breath with the sarcoid, however feels that after having the sarcoid treated her breathing was markedly improved until the summer of 2020 when she noted progressive ROBLERO. She also some SOB at rest. She denied orthopnea PND or LE edema. She got SOB with minimal exertion and fell on her left side. SInce then she had constant left sided chest pain, but later noted exertional chest pressure. Noted increased HR when exerting herself. Noted palpitations on exertion and also with anxiety.      Work up included cardiac MRI 10/6/2020: mildly dilated LV with normal LV systolic function with LVEF 45  %, no fibrosis infiltration or scarring. Moderately dilated LA. Specifically no evidence for cardiac sarcoid  Cardiopulmonary stress test (CPET) Sept 30,2020: patient was hypertensive, blunted HR response ( from 66 to 92 bpm, no evidence of pulmonary limitation overall consistent with deconditioning.   Cardiac Monitor ( 2 WEEK) October 2020: HR 52 to 90 bpm avg 64bpm, 144 VEs SVEs 15,   Cardiac cath ( [facility])  9/15/2020: LAD prox 40-50%, Mid 20-30%, LM < 30%, RCA normal, LCx < 30%.   Renal artery ultrasound 9/23/2020: no renal artery stenosis     Prior to back surgery had CTA with heart flow October 10 2022: prox LAD 50-75% D2 25-50%, LCx 25%, RCA no atherosclerosis. Most places FFR > 0.8. D1 with FFR 0.74 proximally consistent with hemodynamically sifgnificant lesion. However having reviewed study with cardiologist reading exam- D1 is very small vessel ( 3mm) so even though lesion was significant, the vessel is small with a very small territory. Medical management was indicated and there was no indication for cath given small size of vessel.     Since then patient feel and fractured her patella (mechanical fall) and has completed physical therapy. She has additional exercises to regain leg strength. Has a hard time getting out of chair without holding on and pulling herself up. She does walk 2 miles 4 x per week. Does get a bit dizzy if she gets out of bed quickly. If however she sits up and rests then gets up she does better. No dizziness other than orthostatic type sx. No CP or SOB with her current level of activity. No myalgias on statin. No LE swelling . No PND or orthopnea no palpitations presyncope or syncope . Has had COVID recently though recovered from that well. She has not been checking her BP at home. She has been under stress lately because her  has been sick and her sister in law has been staying with them       ROS: Full 10 pt review of symptoms of negative unless discussed above.     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)    Prostate cancer (Multi)     Medical History:   Past Medical History:   Diagnosis Date    Asthma ([facility])     Cataract     Chronic  "sphenoidal sinusitis     Colon polyp     COPD (chronic obstructive pulmonary disease) (Multi)     Coronary artery disease     Depression     Epistaxis     GERD (gastroesophageal reflux disease)     Hyperlipidemia     Hypertension     Lumbar disc disease     Migraine     Nasal lesion     Non-rheumatic mitral regurgitation     Nonrheumatic aortic (valve) insufficiency     Osteoporosis     Peripheral neuropathy     Pulmonary nodules     Pulmonary sarcoidosis (Multi)     in remission    Spinal stenosis     lumbar    Vision loss     right eye     Surgical Hx:   Past Surgical History:   Procedure Laterality Date    ACHILLES TENDON SURGERY Left     ACHILLES TENDON SURGERY Right     CATARACT EXTRACTION  2017    COLONOSCOPY  2021    CT ANGIO CORONARY ART WITH HEARTFLOW IF SCORE >30%  08/10/2017    CT HEART CORONARY ANGIOGRAM 8/10/2017 [facility]    CT ANGIO CORONARY ART WITH HEARTFLOW IF SCORE >30%  10/07/2022    CT HEART CORONARY ANGIOGRAM 10/7/2022 [facility]    EYE SURGERY Right     infection    FOOT SURGERY Right     HEMORRHOID SURGERY      LUMBAR LAMINECTOMY  03/2022    MOUTH SURGERY      Tooth Extraction    NASAL TURBINATE REDUCTION      RHINOPLASTY      SPINE SURGERY  10/2022    WRIST SURGERY Left 07/2021      Social Hx:   Tobacco Use: Low Risk  (9/26/2024)    Patient History     Smoking Tobacco Use: Never     Smokeless Tobacco Use: Never     Passive Exposure: Not on file     Alcohol Use: Not on file     Family Hx:   Family History   Problem Relation Name Age of Onset    Cancer Mother      Heart disease Mother      Alzheimer's disease Mother      Cancer Father      Heart disease Father      Dementia Father        Exam:   Vitals:   Vitals:    09/26/24 1036 09/26/24 1105   BP: 179/84 159/80   BP Location: Left arm    Pulse: 64    SpO2: 98%    Weight: 79.8 kg (175 lb 14.4 oz)    Height: 1.753 m (5' 9\")      Wt Readings from Last 5 Encounters:   09/26/24 79.8 kg (175 lb 14.4 oz)   09/18/24 68 kg (150 lb) "   06/05/24 74.8 kg (165 lb)   05/22/24 74.8 kg (165 lb)   04/29/24 79 kg (174 lb 2.6 oz)      Constitutional:       Appearance: Healthy appearance. Not in distress.   Pulmonary:      Effort: Pulmonary effort is normal.      Breath sounds: Normal breath sounds.   Cardiovascular:      Normal rate. Regular rhythm. S1 with normal intensity. S2 with normal intensity.       Murmurs: There is no murmur.   Edema:     Peripheral edema absent.   Abdominal:      General: Bowel sounds are normal.      Palpations: Abdomen is soft.   Neurological:      Mental Status: Alert and oriented to person, place and time.       Labs:   Recent Labs     04/17/24  1018 10/17/22  0647 10/16/22  0822 10/15/22  1000 10/07/22  1336 06/09/22  1033 06/14/21  1141 09/01/20  0925   WBC 9.5 15.8* 19.2* 12.8* 8.0 7.4 6.2 7.1   HGB 14.6 14.7 15.6 12.5 14.1 13.6 14.0 14.8   HCT 47.3* 45.3 48.3* 39.9 44.6 44.2 43.8 47.5*    234 252 182 225 209 205 206   MCV 93 91 91 92 92 92 91 92     Recent Labs     04/18/24  1141 04/17/24  1018 07/03/23  1155 10/20/22  1015 10/19/22  1050 10/17/22  0647 10/16/22  0822 10/15/22  1000    142 141 139 143 140 144 142   K 4.3 5.6* 4.2 2.9* 3.2* 3.4* 3.4* 4.2    103 104 100 102 102 107 107   BUN 25* 27* 17 12 16 16 18 14   CREATININE 0.92 0.96 0.91 0.57 0.55 0.57 0.72 0.81      Recent Labs     09/01/20  0925   BNP 66     Lab Results   Component Value Date    CHOL 154 07/08/2024    HDL 50.6 07/08/2024    LDLF 65 07/03/2023    TRIG 182 (H) 07/08/2024     Lab Results   Component Value Date    LDLCALC 67 07/08/2024      Notable Studies: imaging personally reviewed and summarized by me below  EKG:  Encounter Date: 09/26/24   ECG 12 lead (Clinic Performed)   Result Value    Ventricular Rate 64    Atrial Rate 64    TX Interval 188    QRS Duration 98    QT Interval 382    QTC Calculation(Bazett) 394    P Axis 71    R Axis -47    T Axis 86    QRS Count 11    Q Onset 211    P Onset 117    P Offset 178    T Offset 402     QTC Fredericia 390    Narrative    Normal sinus rhythm  Left axis deviation  Moderate voltage criteria for LVH, may be normal variant  Nonspecific ST and T wave abnormality  Abnormal ECG  When compared with ECG of 28-MAR-2024 08:16,  Nonspecific T wave abnormality, worse in Lateral leads         Echo:  - Echo (9/10/2020)  PHYSICIAN INTERPRETATION:  Left Ventricle: The left ventricular systolic function is normal, with an estimated ejection fraction of 60-65%. There are no regional wall motion abnormalities. The left ventricular cavity size is normal. There is moderate eccentric left ventricular hypertrophy. Spectral Doppler shows a pseudonormal pattern of left ventricular diastolic filling.  Left Atrium: The left atrium is moderately dilated.  Right Ventricle: The right ventricle is normal in size. There is low normal right ventricular systolic function.  Right Atrium: The right atrium is normal in size.  Aortic Valve: The aortic valve is trileaflet. There is no evidence of aortic valve regurgitation. The peak instantaneous gradient of the aortic valve is 13.5 mmHg.  Mitral Valve: The mitral valve is normal in structure. There is mild mitral annular calcification. There is trace mitral valve regurgitation.  Tricuspid Valve: The tricuspid valve is structurally normal. There is trace tricuspid regurgitation. The Doppler estimated RVSP is slightly elevated at 33.5 mmHg.  Pulmonic Valve: The pulmonic valve is not well visualized. The pulmonic valve regurgitation was not well visualized.  Pericardium: There is no pericardial effusion noted.  Aorta: The aortic root is normal.  Systemic Veins: The inferior vena cava appears to be of normal size. There is IVC inspiratory collapse greater than 50%.  In comparison to the previous echocardiogram(s): Compared with study from 7/21/2015,. Compared with the prior exam from 7/21/2015 there is still preserved LV systolic funciton, however the LA size has greatly increased and  there is now pseudonormal diastolic function. The RVSP is similar. Previously was slightly increased at 31mmHg.        CONCLUSIONS:   1. The left ventricular systolic function is normal with a 60-65% estimated ejection fraction.   2. Poorly visualized anatomical structures due to suboptimal image quality.   3. Spectral Doppler shows a pseudonormal pattern of left ventricular diastolic filling.   4. There is moderate eccentric left ventricular hypertrophy.   5. The left atrium is moderately dilated.   6. Slightly elevated RVSP.   7. Compared with the prior exam from 7/21/2015 there is still preserved LV systolic funciton, however the LA size has greatly increased and there is now pseudonormal diastolic function. The RVSP is similar. Previously was slightly increased at 31mmHg.        Catheterization:  - [facility] (9/15/2020)  CONCLUSIONS:   1. Non-obstructive CAD (40-50% ostial LAD) in a codominant system.   2. Tortuous coronary arteries.   3. Elevated LVEDP of 25 mmHg.        CPET 9/30/2020  Impression:   1. Abnormal cardiopulmonary exercise test.   2. The test was submaximal limiting interpretation. RER did not exceed 1, HR max was only 63% predicted. No ventilatory threshold was achieved.   3. The patient was hypertensive at rest 154/78.   4. The HR response to exercise seemed blunted, 66->92. Significant exercise motion artifact limits interpretation of exercise ECG. No ischemic changes were present during recovery.   5. There was no evidence of respiratory limitation during this limited exam. Breathing reserve was 56%, no significant desaturation was seen. There was a normal drop of dead space with exercise.   6. Overall interpretation is that of deconditioning. Cardiac limitation may be present as Chronotropic response seemed blunted and the patient was hypertensive at rest.        cCTA with heartflow 10/7/2022:   IMPRESSION:  1. No significant left main coronary disease.  2. Mixed atherosclerosis involving the  proximal LAD resulting in  50-75% luminal stenosis. Study was sent to heart flow for evaluation  of FFR CT. Report will be addended once results are available.  3. Mild calcified atherosclerosis involving the proximal LCX  resulting in less than 25% luminal stenosis.  4. Partially visualized nodularity in the right upper lobe which was  previously seen on chest CT 08/24/2029. Recommend continued follow-up  with chest CT for full characterization.  eart flow results:  First Diagonal branch FFRCT 0.74 proximally, suggestive of  hemodynamically-significant lesion  Remainder of the coronary arteries FFRCT>0.8, reflecting  non-hemodynamically lesion         Current Outpatient Medications   Medication Instructions    acetaminophen (TYLENOL 8 HOUR) 650 mg, oral, Every 8 hours PRN, Do not crush, chew, or split.    amLODIPine (NORVASC) 5 mg, oral, Daily    aspirin (ADULT LOW DOSE ASPIRIN) 81 mg, oral    atorvastatin (LIPITOR) 40 mg, oral, Daily    budesonide-formoteroL (Symbicort) 160-4.5 mcg/actuation inhaler 2 puffs, inhalation, 2 times daily RT, Rinse mouth with water after use to reduce aftertaste and incidence of candidiasis. Do not swallow.    calcium carbonate-vitamin D3 500 mg-5 mcg (200 unit) tablet 1 tablet, oral, Daily    cloNIDine (CATAPRES) 0.1 mg, oral, Daily    escitalopram (LEXAPRO) 20 mg, oral, Daily RT    ezetimibe (ZETIA) 10 mg, oral, Nightly    gabapentin (NEURONTIN) 300 mg, oral, Nightly    lamoTRIgine (LAMICTAL) 100 mg, oral, 2 times daily    losartan (COZAAR) 100 mg, oral, Daily    montelukast (SINGULAIR) 10 mg, oral, Daily, as directed     Impressions and Plan:    Patient is a 79 year old woman with sarcoidosis ( lung ) with prior history of moderate proximal LAD lesion ( CTA with herat flow in 2017) with HTN,and hyperlipidemia who has been medically managed since 2017. Previously  noted  progressive ROBLERO  with CP on exertion starting in summer 2020. Cardiac cath in September 2020 showed moderate LAD  disease ( no significant stenoses) and CPET eventually showed deconditioning as well as blunted HR response. Prior to back surgery had CTA with heart flow showing nonsignifiant stenoses except for in D1 which was a small vessel( 3mm) that would not warrant cardiac cath. Pursuing medical therapy at this time. She is tolerating her atorvastatin well without myalgias and a good response with LDL at target.   Plan  1. CAD- atorvastatin, zetia losartan and aspirin.  2. hyperlipidemia- continue atorvastatin 40 mg daily and zetia. LDL target <70 . At target July 2024  3. HTN- Not checking BP  at home . To continue clonidine .1mg, losartan 100 mg, and amlodipine 5 mg daily. BP high in office today, though improved somewhat on recheck. Pt to resume checking BP at home and call the office in 1 month to review readings ( call sooner if consistently high for med adjustment)  4. Dyspnea- resolved  5. sarcoid- rx of lung sarcoid as per Dr Gonzales.  6. Continue current level of exercise , especially the strength training    Return to clinic in 3 months.     Patient Instructions:  If you have any questions or need cardiac medication refills, please call my office at [phone],      To reach my office please call [phone]  To schedule an appointment call [phone].          ____________________________________________________________  Rea Ramirez MD  Division of Cardiovascular Medicine  [facility]  [facility]    Yes

## 2024-10-01 ENCOUNTER — TELEPHONE (OUTPATIENT)
Dept: SCHEDULING | Age: 79
End: 2024-10-01
Payer: MEDICARE

## 2024-10-01 DIAGNOSIS — I10 PRIMARY HYPERTENSION: Primary | ICD-10-CM

## 2024-10-01 RX ORDER — TRIAMTERENE AND HYDROCHLOROTHIAZIDE 37.5; 25 MG/1; MG/1
1 CAPSULE ORAL EVERY MORNING
Qty: 90 CAPSULE | Refills: 3 | Status: SHIPPED | OUTPATIENT
Start: 2024-10-01 | End: 2025-10-01

## 2024-10-01 NOTE — TELEPHONE ENCOUNTER
Patient phoned the office.  Home readings have been similar to office readings, around 160 mmHg systolic.  She is already on treatment with clonidine, amlodipine and losartan.  Normal renal function.  She does have a dry mouth associated with clonidine therapy.  Recommend low-dose diuretic.  Prescribed triamterene-hydrochlorothiazide.  She will continue to record her blood pressures at home for several weeks, and will get in touch with primary physician and with Dr. Ramirez with a series of blood pressure readings.

## 2024-10-14 ENCOUNTER — HOSPITAL ENCOUNTER (EMERGENCY)
Facility: HOSPITAL | Age: 79
Discharge: HOME | End: 2024-10-14
Attending: STUDENT IN AN ORGANIZED HEALTH CARE EDUCATION/TRAINING PROGRAM
Payer: MEDICARE

## 2024-10-14 ENCOUNTER — APPOINTMENT (OUTPATIENT)
Dept: RADIOLOGY | Facility: HOSPITAL | Age: 79
End: 2024-10-14
Payer: MEDICARE

## 2024-10-14 VITALS
SYSTOLIC BLOOD PRESSURE: 135 MMHG | TEMPERATURE: 98.9 F | BODY MASS INDEX: 23.7 KG/M2 | HEIGHT: 69 IN | HEART RATE: 70 BPM | OXYGEN SATURATION: 95 % | WEIGHT: 160 LBS | RESPIRATION RATE: 16 BRPM | DIASTOLIC BLOOD PRESSURE: 68 MMHG
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
## 2024-10-14 DIAGNOSIS — S09.90XA CLOSED HEAD INJURY, INITIAL ENCOUNTER: ICD-10-CM

## 2024-10-14 DIAGNOSIS — S01.81XA LACERATION OF FOREHEAD, INITIAL ENCOUNTER: ICD-10-CM

## 2024-10-14 PROCEDURE — 12013 RPR F/E/E/N/L/M 2.6-5.0 CM: CPT

## 2024-10-14 PROCEDURE — 90471 IMMUNIZATION ADMIN: CPT | Performed by: STUDENT IN AN ORGANIZED HEALTH CARE EDUCATION/TRAINING PROGRAM

## 2024-10-14 PROCEDURE — 2500000001 HC RX 250 WO HCPCS SELF ADMINISTERED DRUGS (ALT 637 FOR MEDICARE OP): Performed by: STUDENT IN AN ORGANIZED HEALTH CARE EDUCATION/TRAINING PROGRAM

## 2024-10-14 PROCEDURE — 70450 CT HEAD/BRAIN W/O DYE: CPT

## 2024-10-14 PROCEDURE — 72125 CT NECK SPINE W/O DYE: CPT | Performed by: RADIOLOGY

## 2024-10-14 PROCEDURE — 12002 RPR S/N/AX/GEN/TRNK2.6-7.5CM: CPT | Performed by: STUDENT IN AN ORGANIZED HEALTH CARE EDUCATION/TRAINING PROGRAM

## 2024-10-14 PROCEDURE — 2500000004 HC RX 250 GENERAL PHARMACY W/ HCPCS (ALT 636 FOR OP/ED): Performed by: STUDENT IN AN ORGANIZED HEALTH CARE EDUCATION/TRAINING PROGRAM

## 2024-10-14 PROCEDURE — 90715 TDAP VACCINE 7 YRS/> IM: CPT | Performed by: STUDENT IN AN ORGANIZED HEALTH CARE EDUCATION/TRAINING PROGRAM

## 2024-10-14 PROCEDURE — 70450 CT HEAD/BRAIN W/O DYE: CPT | Performed by: RADIOLOGY

## 2024-10-14 PROCEDURE — 72125 CT NECK SPINE W/O DYE: CPT

## 2024-10-14 PROCEDURE — 2500000005 HC RX 250 GENERAL PHARMACY W/O HCPCS: Performed by: STUDENT IN AN ORGANIZED HEALTH CARE EDUCATION/TRAINING PROGRAM

## 2024-10-14 PROCEDURE — 99285 EMERGENCY DEPT VISIT HI MDM: CPT | Mod: 25

## 2024-10-14 RX ORDER — PROCHLORPERAZINE MALEATE 10 MG
5 TABLET ORAL EVERY 6 HOURS PRN
Qty: 3 TABLET | Refills: 0 | Status: SHIPPED | OUTPATIENT
Start: 2024-10-14

## 2024-10-14 RX ORDER — ONDANSETRON 4 MG/1
4 TABLET, ORALLY DISINTEGRATING ORAL ONCE
Status: COMPLETED | OUTPATIENT
Start: 2024-10-14 | End: 2024-10-14

## 2024-10-14 RX ORDER — ACETAMINOPHEN 500 MG
1000 TABLET ORAL EVERY 6 HOURS PRN
Qty: 30 TABLET | Refills: 0 | Status: SHIPPED | OUTPATIENT
Start: 2024-10-14 | End: 2024-10-24

## 2024-10-14 RX ORDER — LIDOCAINE HYDROCHLORIDE 10 MG/ML
10 INJECTION, SOLUTION INFILTRATION; PERINEURAL ONCE
Status: DISCONTINUED | OUTPATIENT
Start: 2024-10-14 | End: 2024-10-14 | Stop reason: HOSPADM

## 2024-10-14 RX ORDER — PROCHLORPERAZINE MALEATE 5 MG
5 TABLET ORAL ONCE
Status: DISCONTINUED | OUTPATIENT
Start: 2024-10-14 | End: 2024-10-14 | Stop reason: HOSPADM

## 2024-10-14 RX ORDER — ACETAMINOPHEN 325 MG/1
975 TABLET ORAL ONCE
Status: COMPLETED | OUTPATIENT
Start: 2024-10-14 | End: 2024-10-14

## 2024-10-14 ASSESSMENT — PAIN DESCRIPTION - ORIENTATION: ORIENTATION: LEFT

## 2024-10-14 ASSESSMENT — PAIN DESCRIPTION - LOCATION: LOCATION: HEAD

## 2024-10-14 ASSESSMENT — PAIN SCALES - GENERAL
PAINLEVEL_OUTOF10: 6
PAINLEVEL_OUTOF10: 5 - MODERATE PAIN

## 2024-10-14 ASSESSMENT — PAIN - FUNCTIONAL ASSESSMENT
PAIN_FUNCTIONAL_ASSESSMENT: 0-10
PAIN_FUNCTIONAL_ASSESSMENT: 0-10

## 2024-10-14 ASSESSMENT — PAIN DESCRIPTION - DESCRIPTORS: DESCRIPTORS: ACHING

## 2024-10-14 ASSESSMENT — PAIN DESCRIPTION - PAIN TYPE: TYPE: ACUTE PAIN

## 2024-10-14 ASSESSMENT — PAIN DESCRIPTION - FREQUENCY: FREQUENCY: CONSTANT/CONTINUOUS

## 2024-10-14 NOTE — ED TRIAGE NOTES
Pt states she tripped on a curb and fell. Pt states she struck her head on concrete. Pt has a laceration above the left eye. Pt denies LOC or neck/back pain. Pt c/o headache and left eye blurred vision. Pt alert and oriented x 4.

## 2024-10-14 NOTE — ED PROVIDER NOTES
Emergency Department Provider Note        History of Present Illness     Chief Complaint: Fall and Laceration   History provided by: Patient  Limitations to History: None  External Chart Review: See ED Course    HPI:  Luis Ann is a 79 y.o. female with PMHx of HTN, HLD, CAD, sarcoidosis presenting to the ED for fall with head strike.    Patient reports that immediately prior to arrival she tripped over a curb and fell forward striking her head on concrete.  She denies loss of consciousness.  Is not on any anticoagulation.  Reports a mild throbbing headache since the fall.  Initially had some blurred vision but this has since resolved.  Denies eye pain, foreign body sensation. Denies neck pain, numbness, weakness.  Unsure of last Tdap.  She denies any preceding chest pain, shortness of breath, palpitations.  Denies pain elsewhere.    Physical Exam   Triage vitals:  T 37.2 °C (98.9 °F)  HR 75  /78  RR 16  O2 94 %      PRIMARY SURVEY:  AIRWAY: intact  BREATHING: bilateral breath sounds, trachea midline  CIRCULATION: 2+ radial and DP pulses  DEFICIT: motor and sensation grossly intact in BUE and BLE  GCS: 15              -- EYES: 4              -- VERBAL: 5              -- MOTOR: 6     SECONDARY SURVEY:  HEAD: V shaped laceration 4 cm above L eyebrow, no Battles signs or racoon eyes  FACE: midface stable  EYES: PERRL, no conjunctival hemorrhage or chemosis, EOMI  NOSE: no nasal septal hematoma  MOUTH: no lip lacerations, no loose or fractured teeth, no intraoral lesions, no dental malocclusion, no mandibular tenderness     C-SPINE: no midline tenderness or bony step-offs or deformities  CHEST: no clavicular or chest wall tenderness, no crepitus, no flail chest  ABD: no abdominal tenderness or ecchymosis  PELVIS: pelvis stable  EXT: no obvious deformities or tenderness or wounds to BUE and BLE, intact range of motion  SKIN: laceration as above  BACK: no midline tenderness or bony step-offs or deformities in  TLS spine    Medical Decision Making & ED Course   Medical Decision Making:  Luis Ann is a 79 y.o. female with PMHx as above in HPI who presented to the ED for mechanical fall. Patient was afebrile, hemodynamically stable, and satting on room air on arrival.     Exam as above.    Imaging as below and negative for acute traumatic injuries.  Tetanus was updated.  Laceration was repaired with absorbable suture.  She was given a dose of Tylenol for her headache with some improvement.    On re-examination, they are well-appearing and have remained hemodynamically stable. They are  ambulating without difficulty and tolerating PO. They are appropriate for discharge at this time. They will follow up with PCP.  Discussed that she likely has a concussion as result of fall and encouraged brain rest over the next few days.  New prescriptions for discharge below. Return precautions were reviewed. Plan was discussed with  patient and they are agreeable.  ------------------------------------------------  Independent Test Interpretation: See ED Course    ED Course:  ED Course as of 10/14/24 2212   Mon Oct 14, 2024   1600 External chart review:  Cards office visit Sept 2024  Notes hx HTN, HLD, sarcoidosis, CAD     [SS]   1715 CT head wo IV contrast  I have personally reviewed and interpreted this CT head, which shows no large ICH. Final decision making pending radiology read.   [SS]   1730 CT head wo IV contrast  Radiology read:   No acute intracranial abnormality or calvarial fracture.  Senescent changes   [SS]   1730 CT cervical spine wo IV contrast  Radiology read:  CT CERVICAL SPINE:  No acute fracture or traumatic malalignment of the cervical spine.  Mild multilevel degenerative disc disease. Vascular calcification   [SS]      ED Course User Index  [SS] Steffen Simerlink, MD         Diagnoses as of 10/14/24 2212   Fall, initial encounter   Closed head injury, initial encounter   Laceration of forehead, initial encounter      Disposition   As a result of the work-up, the patient was discharged home.  she was informed of her diagnosis and instructed to come back with any concerns or worsening of condition.  she and was agreeable to the plan as discussed above.  she was given the opportunity to ask questions.  All of the patient's questions were answered.    ED Prescriptions       Medication Sig Dispense Start Date End Date Auth. Provider    acetaminophen (Tylenol) 500 mg tablet Take 2 tablets (1,000 mg) by mouth every 6 hours if needed for mild pain (1 - 3) for up to 10 days. 30 tablet 10/14/2024 10/24/2024 Steffen Simerlink, MD    prochlorperazine (Compazine) 10 mg tablet Take 0.5 tablets (5 mg) by mouth every 6 hours if needed for nausea or vomiting. 3 tablet 10/14/2024 -- Steffen Simerlink, MD            Procedures   Laceration Repair    Performed by: Steffen Simerlink, MD  Authorized by: Steffen Simerlink, MD    Consent:     Consent obtained:  Verbal    Consent given by:  Patient    Risks, benefits, and alternatives were discussed: yes      Risks discussed:  Infection, pain and poor cosmetic result    Alternatives discussed:  No treatment and delayed treatment  Anesthesia:     Anesthesia method:  Local infiltration    Local anesthetic:  Lidocaine 1% w/o epi  Laceration details:     Location:  Scalp    Scalp location:  Frontal    Length (cm):  4  Treatment:     Irrigation solution:  Sterile water  Skin repair:     Repair method:  Sutures    Suture size:  5-0    Suture material:  Plain gut    Suture technique:  Simple interrupted    Number of sutures:  7  Approximation:     Approximation:  Close  Repair type:     Repair type:  Simple  Post-procedure details:     Dressing:  Open (no dressing)    Procedure completion:  Tolerated well, no immediate complications      Steffen Simerlink, MD Steffen Simerlink, MD  10/14/24 0326

## 2024-10-14 NOTE — DISCHARGE INSTRUCTIONS
Tylenol (acetaminophen) Warning: Some medications you have been given today contain Tylenol (acetaminophen).  Do not take more than 4,000mg of tylenol (acetaminophen) in any 24 hour period. Do not take other medications which contain Tylenol (acetaminophen).  Many common over the counter cold and pain medications include Tylenol (acetaminophen) as an ingredient.  Please be very careful, and if you are unsure ask your doctor or pharmacist.  All cuts and wounds heal by forming a scar.  Every cut will leave a scar.  The stiches/dermabond/steristrips that were placed at your visit are meant to minimize the scarring of your cut.  It is very important to follow the instructions as given and return if you see any sign of infection or have any problems.    Head Injury Instructions: Return to the ED if you have problems seeing, walking, talking, if you vomit more than once, if you are hard to wake up, have unequal pupils, slurred speech, or a seizure.

## 2024-10-16 ENCOUNTER — TELEPHONE (OUTPATIENT)
Dept: PULMONOLOGY | Facility: CLINIC | Age: 79
End: 2024-10-16
Payer: MEDICARE

## 2024-10-16 NOTE — TELEPHONE ENCOUNTER
Patient had a fall and had to get stitches and has a concussion. Patient was told to follow up with you. Should she be follow ing up with you regarding this?

## 2024-10-19 DIAGNOSIS — I10 HYPERTENSION, UNSPECIFIED TYPE: ICD-10-CM

## 2024-10-21 RX ORDER — AMLODIPINE BESYLATE 5 MG/1
5 TABLET ORAL DAILY
Qty: 90 TABLET | Refills: 3 | Status: SHIPPED | OUTPATIENT
Start: 2024-10-21

## 2024-10-23 ENCOUNTER — OFFICE VISIT (OUTPATIENT)
Dept: PULMONOLOGY | Facility: CLINIC | Age: 79
End: 2024-10-23
Payer: MEDICARE

## 2024-10-23 VITALS
TEMPERATURE: 97.6 F | WEIGHT: 174 LBS | SYSTOLIC BLOOD PRESSURE: 126 MMHG | DIASTOLIC BLOOD PRESSURE: 68 MMHG | BODY MASS INDEX: 25.7 KG/M2 | RESPIRATION RATE: 18 BRPM | HEART RATE: 66 BPM | OXYGEN SATURATION: 94 %

## 2024-10-23 DIAGNOSIS — S06.0X0A CONCUSSION WITHOUT LOSS OF CONSCIOUSNESS, INITIAL ENCOUNTER: Primary | ICD-10-CM

## 2024-10-23 DIAGNOSIS — I10 PRIMARY HYPERTENSION: ICD-10-CM

## 2024-10-23 PROCEDURE — 1159F MED LIST DOCD IN RCRD: CPT | Performed by: INTERNAL MEDICINE

## 2024-10-23 PROCEDURE — 99214 OFFICE O/P EST MOD 30 MIN: CPT | Performed by: INTERNAL MEDICINE

## 2024-10-23 PROCEDURE — 1157F ADVNC CARE PLAN IN RCRD: CPT | Performed by: INTERNAL MEDICINE

## 2024-10-23 PROCEDURE — 3078F DIAST BP <80 MM HG: CPT | Performed by: INTERNAL MEDICINE

## 2024-10-23 PROCEDURE — 1036F TOBACCO NON-USER: CPT | Performed by: INTERNAL MEDICINE

## 2024-10-23 PROCEDURE — 3074F SYST BP LT 130 MM HG: CPT | Performed by: INTERNAL MEDICINE

## 2024-10-23 PROCEDURE — 1160F RVW MEDS BY RX/DR IN RCRD: CPT | Performed by: INTERNAL MEDICINE

## 2024-10-23 ASSESSMENT — ENCOUNTER SYMPTOMS
ARTHRALGIAS: 1
ABDOMINAL PAIN: 0
EYE DISCHARGE: 0
DYSURIA: 0
DIFFICULTY URINATING: 0
NAUSEA: 0
FEVER: 0
HEMATURIA: 0
CHOKING: 0
BACK PAIN: 1
WHEEZING: 0
JOINT SWELLING: 0
WEAKNESS: 0
SPEECH DIFFICULTY: 0
PALPITATIONS: 0
SINUS PAIN: 0
ADENOPATHY: 0
UNEXPECTED WEIGHT CHANGE: 0
FACIAL SWELLING: 0
TREMORS: 0
SHORTNESS OF BREATH: 0
FATIGUE: 0
RHINORRHEA: 0
HEADACHES: 0
SLEEP DISTURBANCE: 0
APNEA: 0
CONSTIPATION: 0
LIGHT-HEADEDNESS: 0
STRIDOR: 0
FREQUENCY: 0
NERVOUS/ANXIOUS: 0
AGITATION: 0
DIZZINESS: 0
NUMBNESS: 0
BRUISES/BLEEDS EASILY: 0
EYE REDNESS: 0
ABDOMINAL DISTENTION: 0
SINUS PRESSURE: 0
COUGH: 0
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
## 2024-11-11 DIAGNOSIS — I10 HYPERTENSION, UNSPECIFIED TYPE: ICD-10-CM

## 2024-11-11 RX ORDER — LOSARTAN POTASSIUM 100 MG/1
100 TABLET ORAL DAILY
Qty: 90 TABLET | Refills: 3 | Status: SHIPPED | OUTPATIENT
Start: 2024-11-11 | End: 2025-11-11

## 2024-11-25 ENCOUNTER — APPOINTMENT (OUTPATIENT)
Dept: PRIMARY CARE | Facility: CLINIC | Age: 79
End: 2024-11-25
Payer: MEDICARE

## 2024-11-25 DIAGNOSIS — F07.81 POST CONCUSSION SYNDROME: Primary | ICD-10-CM

## 2024-11-25 PROBLEM — H35.351 CME (CYSTOID MACULAR EDEMA), RIGHT: Status: ACTIVE | Noted: 2018-05-24

## 2024-11-25 PROBLEM — G56.02 LEFT CARPAL TUNNEL SYNDROME: Status: ACTIVE | Noted: 2024-11-25

## 2024-11-25 PROBLEM — R00.2 HEART PALPITATIONS: Status: ACTIVE | Noted: 2024-11-25

## 2024-11-25 PROBLEM — Z86.0101 H/O ADENOMATOUS POLYP OF COLON: Status: ACTIVE | Noted: 2020-02-06

## 2024-11-25 PROBLEM — R92.30 DENSE BREAST TISSUE ON MAMMOGRAM: Status: ACTIVE | Noted: 2024-11-25

## 2024-11-25 PROBLEM — M81.0 OSTEOPOROSIS: Status: ACTIVE | Noted: 2024-11-25

## 2024-11-25 PROBLEM — Z86.59 HISTORY OF DEPRESSION: Status: ACTIVE | Noted: 2024-11-25

## 2024-11-25 PROBLEM — E87.6 HYPOKALEMIA: Status: ACTIVE | Noted: 2024-11-25

## 2024-11-25 PROBLEM — G31.84 MILD COGNITIVE IMPAIRMENT: Status: ACTIVE | Noted: 2021-01-07

## 2024-11-25 PROBLEM — I51.7 LEFT VENTRICULAR HYPERTROPHY BY ELECTROCARDIOGRAM: Status: ACTIVE | Noted: 2018-03-01

## 2024-11-25 PROBLEM — M79.673 FOOT PAIN: Status: ACTIVE | Noted: 2024-11-25

## 2024-11-25 PROBLEM — G43.009 MIGRAINE WITHOUT AURA: Status: ACTIVE | Noted: 2020-02-06

## 2024-11-25 PROBLEM — G62.9 PERIPHERAL NEUROPATHY: Status: ACTIVE | Noted: 2021-05-06

## 2024-11-25 PROBLEM — S82.032A: Status: ACTIVE | Noted: 2024-06-25

## 2024-11-25 PROBLEM — B00.1 COLD SORE: Status: ACTIVE | Noted: 2024-11-25

## 2024-11-25 PROBLEM — R68.89 DECREASED ACTIVITY TOLERANCE: Status: ACTIVE | Noted: 2024-06-25

## 2024-11-25 PROBLEM — M51.16 LUMBAR DISC DISEASE WITH RADICULOPATHY: Status: ACTIVE | Noted: 2024-11-25

## 2024-11-25 PROBLEM — H04.122 DRY EYE SYNDROME OF LEFT EYE: Status: ACTIVE | Noted: 2023-06-12

## 2024-11-25 PROBLEM — H44.009 ENDOPHTHALMITIS: Status: ACTIVE | Noted: 2018-05-24

## 2024-11-25 PROBLEM — R29.6 FREQUENT FALLS: Status: ACTIVE | Noted: 2024-06-25

## 2024-11-25 PROBLEM — M48.062 LUMBAR STENOSIS WITH NEUROGENIC CLAUDICATION: Status: ACTIVE | Noted: 2024-11-25

## 2024-11-25 PROBLEM — S01.511A LIP LACERATION: Status: ACTIVE | Noted: 2024-06-25

## 2024-11-25 PROBLEM — J44.9 CHRONIC OBSTRUCTIVE PULMONARY DISEASE (MULTI): Status: ACTIVE | Noted: 2022-02-10

## 2024-11-25 PROBLEM — K60.2 ANAL FISSURE: Status: ACTIVE | Noted: 2024-11-25

## 2024-11-25 PROBLEM — J30.89 CHRONIC NON-SEASONAL ALLERGIC RHINITIS: Status: ACTIVE | Noted: 2020-02-06

## 2024-11-25 PROBLEM — M46.1 INFLAMMATION OF SACROILIAC JOINT (CMS-HCC): Status: ACTIVE | Noted: 2024-11-25

## 2024-11-25 PROBLEM — R91.8 MULTIPLE PULMONARY NODULES: Status: ACTIVE | Noted: 2024-11-25

## 2024-11-25 PROBLEM — T14.8XXA FRACTURE OF BONE: Status: ACTIVE | Noted: 2024-11-25

## 2024-11-25 PROBLEM — A31.0: Status: ACTIVE | Noted: 2024-11-25

## 2024-11-25 PROBLEM — R94.39 ABNORMAL CARDIOVASCULAR STRESS TEST: Status: ACTIVE | Noted: 2024-11-25

## 2024-11-25 PROBLEM — R26.89 BALANCE PROBLEMS: Status: ACTIVE | Noted: 2021-01-07

## 2024-11-25 PROBLEM — H35.82: Status: ACTIVE | Noted: 2023-06-12

## 2024-11-25 PROBLEM — K21.9 GASTROESOPHAGEAL REFLUX DISEASE WITHOUT ESOPHAGITIS: Status: ACTIVE | Noted: 2020-02-06

## 2024-11-25 PROBLEM — S82.009A PATELLAR FRACTURE: Status: ACTIVE | Noted: 2023-03-08

## 2024-11-25 PROBLEM — I35.1 NONRHEUMATIC AORTIC VALVE INSUFFICIENCY: Status: ACTIVE | Noted: 2018-03-01

## 2024-11-25 PROBLEM — M54.50 LOW BACK PAIN: Status: ACTIVE | Noted: 2024-11-25

## 2024-11-25 PROBLEM — B37.9 INFECTION DUE TO YEAST: Status: ACTIVE | Noted: 2024-11-25

## 2024-11-25 PROBLEM — T70.20XA ALTITUDE SICKNESS: Status: ACTIVE | Noted: 2024-11-25

## 2024-11-25 PROBLEM — I45.89 CHRONOTROPIC INCOMPETENCE: Status: ACTIVE | Noted: 2024-11-25

## 2024-11-25 PROBLEM — R00.0 TACHYCARDIA: Status: ACTIVE | Noted: 2024-11-25

## 2024-11-25 PROBLEM — H40.051: Status: ACTIVE | Noted: 2020-02-06

## 2024-11-25 PROBLEM — E78.5 HYPERLIPIDEMIA: Status: ACTIVE | Noted: 2018-03-01

## 2024-11-25 PROBLEM — Z96.1 PSEUDOPHAKIA OF BOTH EYES: Status: ACTIVE | Noted: 2018-05-24

## 2024-11-25 PROBLEM — D86.0 SARCOIDOSIS OF LUNG (MULTI): Status: ACTIVE | Noted: 2018-03-01

## 2024-11-25 PROBLEM — M47.812 CERVICAL SPONDYLOSIS: Status: ACTIVE | Noted: 2024-11-25

## 2024-11-25 PROBLEM — J45.909 ASTHMATIC BRONCHITIS (HHS-HCC): Status: ACTIVE | Noted: 2024-11-25

## 2024-11-25 PROBLEM — H54.7 PARTIAL BLINDNESS: Status: ACTIVE | Noted: 2024-11-25

## 2024-11-25 PROBLEM — R06.09 DYSPNEA ON EFFORT: Status: ACTIVE | Noted: 2024-11-25

## 2024-11-25 PROBLEM — S52.509A CLOSED FRACTURE OF DISTAL END OF RADIUS: Status: ACTIVE | Noted: 2024-11-25

## 2024-11-25 PROBLEM — N30.00 ACUTE CYSTITIS WITHOUT HEMATURIA: Status: ACTIVE | Noted: 2024-11-25

## 2024-11-25 PROBLEM — F39: Status: ACTIVE | Noted: 2020-02-06

## 2024-11-25 PROBLEM — R29.890 LOSS OF HEIGHT: Status: ACTIVE | Noted: 2024-11-25

## 2024-11-25 PROBLEM — N64.59 BREAST COMPLAINT: Status: ACTIVE | Noted: 2024-11-25

## 2024-11-25 PROBLEM — C44.90 NON-MELANOMA SKIN CANCER: Status: ACTIVE | Noted: 2024-11-25

## 2024-11-25 PROBLEM — M20.5X2 ACQUIRED HALLUX LIMITUS OF LEFT FOOT: Status: ACTIVE | Noted: 2018-08-11

## 2024-11-25 PROBLEM — I34.0 NON-RHEUMATIC MITRAL REGURGITATION: Status: ACTIVE | Noted: 2018-03-01

## 2024-11-25 PROBLEM — M70.62 TROCHANTERIC BURSITIS OF LEFT HIP: Status: ACTIVE | Noted: 2024-11-25

## 2024-11-25 PROBLEM — G89.18 POSTOPERATIVE PAIN: Status: ACTIVE | Noted: 2024-11-25

## 2024-11-25 PROBLEM — M76.62 ACHILLES TENDINITIS, LEFT LEG: Status: ACTIVE | Noted: 2018-08-11

## 2024-11-25 PROBLEM — R14.0 FLATULENCE/GAS PAIN/BELCHING: Status: ACTIVE | Noted: 2024-11-25

## 2024-11-25 PROBLEM — H26.9 CATARACT: Status: ACTIVE | Noted: 2018-05-24

## 2024-11-25 PROBLEM — Z98.1 S/P LUMBAR FUSION: Status: ACTIVE | Noted: 2022-12-08

## 2024-11-25 PROBLEM — B00.1 HERPES LABIALIS: Status: ACTIVE | Noted: 2024-11-25

## 2024-11-25 PROBLEM — H26.492 PCO (POSTERIOR CAPSULAR OPACIFICATION), LEFT: Status: ACTIVE | Noted: 2023-06-16

## 2024-11-25 PROCEDURE — 99213 OFFICE O/P EST LOW 20 MIN: CPT | Performed by: FAMILY MEDICINE

## 2024-11-25 PROCEDURE — 1036F TOBACCO NON-USER: CPT | Performed by: FAMILY MEDICINE

## 2024-11-25 PROCEDURE — 1157F ADVNC CARE PLAN IN RCRD: CPT | Performed by: FAMILY MEDICINE

## 2024-11-25 PROCEDURE — 1124F ACP DISCUSS-NO DSCNMKR DOCD: CPT | Performed by: FAMILY MEDICINE

## 2024-11-25 PROCEDURE — 1159F MED LIST DOCD IN RCRD: CPT | Performed by: FAMILY MEDICINE

## 2024-11-25 NOTE — PROGRESS NOTES
Pt comes in for a concussion.     Luis Ann presenting to concussion clinic for evaluation.     Oct 14, 2024- she ws getting out of her car getting up to a curb and she fell and his the front left part of her forehead. Her glasses cut her head.  No LOC.   EMS took her to the ER.   CT was done and she was discharged home.        Balance- needs to use assistance with walking around, such as a cane or walker. Balance is worse since the Oct 14th fall.   Right eye- poor vision since a previous cataract procedure.   Headaches- she needs to wear sunglasses at home. She does have a headache neurologist. Sometimes they last all night long.   Fatigue- she is tired.   Drowsiness- drowsy every afternoon.  Sadness- due to the worsening of her conditions since her fall.   Falling- she admits to falling history.       PMH: history of skin cancer. NO history of prostate cancer (likely chart error).       Work: none  School: none  Sports: walking about 4-5x/week.   Paperwork: no injury report. No police. EMS took her to ER. No attorneys.       Healthcare team:  - neurology- headache  - neurology- balance  - cardiology  - ENT        Date of current head injury:   - Oct 14, 2024  Previous concussion history:  - none  Imaging for a head injury/concussion:  - CT head 10/14/24: unremarkable  Depression, anxiety, psychiatric disorder, learning disability, dyslexia, ADD/ADHD?:  - depression/anxiety managed by neurology.   - 20 years ago went to [facility] for psychiatric/addiction treatment. 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 And she reports back in the past her internist wrote for percocet.   Headache history:   - migraine history for her whole life, she has a headache neurologist here in [place] and also in [place].         Concussion symptoms: severity 0 to 6    Headache 4  Pressure in head 0  Neck pain 0  Nausea or vomiting 0  Dizziness 6  Blurred vision 0  Balance problems 6  Sensitivity  to light 5  Sensitivity to noise 0  Feeling slowed down 6  Feeling like in a fog 0  Don't feel right 6  Difficulty concentrating 0  Difficulty remembering 0  Fatigue or low energy 5  Confusion 5  Drowsiness 6  More emotional 0  Irritability 5  Sadness 4  Nervous or Anxious 4  Trouble falling asleep 0    There were no vitals taken for this visit.     Gen: NAD, Alert and oriented x3  Head: no specific areas of ttp; no step offs  Face: no specific areas of ttp; no step offs  Neck: no posterior ttp.    Psych: mood pleasant and appropriate  Resp: good respiratory effort  Neurologic: CN II-XII grossly intact. Strength and sensation symmetric and intact throughout all 4 extremities. DTR 2+ in all 4 extremities. Cerebellar testing normal. Negative Rhomberg's test. No dysdiadochokinesis.  Gait: normal        CT head wo IV contrast, CT cervical spine wo IV contrast  Narrative: Interpreted By:  Meng Sparks,   STUDY:  CT HEAD WO IV CONTRAST; CT CERVICAL SPINE WO IV CONTRAST;  10/14/2024  4:38 pm      INDICATION:  Signs/Symptoms:fall head strike      COMPARISON:  03/07/2020      ACCESSION NUMBER(S):  [id number]; [id number]      ORDERING CLINICIAN:  STEFFEN SIMERLINK      TECHNIQUE:  Axial noncontrast CT images of head with coronal and sagittal  reconstructed images. Axial noncontrast CT images of the cervical  spine with coronal and sagittal reconstructed images.      FINDINGS:  CT HEAD:      BRAIN PARENCHYMA:  No evidence of acute intraparenchymal hemorrhage  or parenchymal evidence of acute large territory ischemic infarct. No  mass-effect, midline shift or effacement of cerebral sulci.  Gray-white matter distinction is preserved. Global volume loss and  chronic small vessel ischemic change      VENTRICLES and EXTRA-AXIAL SPACES:  No acute extra-axial or  intraventricular hemorrhage. Ventricles and sulci are age-concordant.      PARANASAL SINUSES/MASTOIDS:  No hemorrhage or air-fluid levels within  the visualized  paranasal sinuses. The mastoid air cells are  well-aerated.      CALVARIUM/ORBITS:  No skull fracture.  The orbits and globes are  intact to the extent visualized.      EXTRACRANIAL SOFT TISSUES: No discernible abnormality.          CT CERVICAL SPINE:      PREVERTEBRAL SOFT TISSUES: Within normal limits.      CRANIOCERVICAL JUNCTION: Intact.      ALIGNMENT:  No traumatic malalignment or traumatic facet widening.      VERTEBRAE:  No acute fracture. Vertebral body heights are maintained.      Mild degenerative changes detected. This is most notable at C5-6      OTHER: Vascular calcification      Impression: CT HEAD:  No acute intracranial abnormality or calvarial fracture.  Senescent changes      CT CERVICAL SPINE:  No acute fracture or traumatic malalignment of the cervical spine.  Mild multilevel degenerative disc disease. Vascular calcification      Signed by: Meng Sparks 10/14/2024 5:21 PM  Dictation workstation:   [id number]      No MRI head results found for the past 12 months     CT head wo IV contrast    Result Date: 10/14/2024  Interpreted By:  Meng Sparks, STUDY: CT HEAD WO IV CONTRAST; CT CERVICAL SPINE WO IV CONTRAST;  10/14/2024 4:38 pm   INDICATION: Signs/Symptoms:fall head strike   COMPARISON: 03/07/2020   ACCESSION NUMBER(S): [id number]; [id number]   ORDERING CLINICIAN: STEFFEN SIMERLINK   TECHNIQUE: Axial noncontrast CT images of head with coronal and sagittal reconstructed images. Axial noncontrast CT images of the cervical spine with coronal and sagittal reconstructed images.   FINDINGS: CT HEAD:   BRAIN PARENCHYMA:  No evidence of acute intraparenchymal hemorrhage or parenchymal evidence of acute large territory ischemic infarct. No mass-effect, midline shift or effacement of cerebral sulci. Gray-white matter distinction is preserved. Global volume loss and chronic small vessel ischemic change   VENTRICLES and EXTRA-AXIAL SPACES:  No acute extra-axial or intraventricular hemorrhage.  Ventricles and sulci are age-concordant.   PARANASAL SINUSES/MASTOIDS:  No hemorrhage or air-fluid levels within the visualized paranasal sinuses. The mastoid air cells are well-aerated.   CALVARIUM/ORBITS:  No skull fracture.  The orbits and globes are intact to the extent visualized.   EXTRACRANIAL SOFT TISSUES: No discernible abnormality.     CT CERVICAL SPINE:   PREVERTEBRAL SOFT TISSUES: Within normal limits.   CRANIOCERVICAL JUNCTION: Intact.   ALIGNMENT:  No traumatic malalignment or traumatic facet widening.   VERTEBRAE:  No acute fracture. Vertebral body heights are maintained.   Mild degenerative changes detected. This is most notable at C5-6   OTHER: Vascular calcification       CT HEAD: No acute intracranial abnormality or calvarial fracture. Senescent changes   CT CERVICAL SPINE: No acute fracture or traumatic malalignment of the cervical spine. Mild multilevel degenerative disc disease. Vascular calcification   Signed by: Meng Sparks 10/14/2024 5:21 PM Dictation workstation:   [id number]    CT head wo IV contrast    Result Date: 6/24/2024  * * *Final Report* * * DATE OF EXAM: Jun 24 2024 11:30AM   [facility]   0504  -  CT BRAIN WO IVCON   / ACCESSION #  [id number] PROCEDURE REASON: Head trauma, moderate-severe      * * * * Physician Interpretation * * * * RESULT: EXAMINATION:  CT BRAIN WO IVCON, CT FACIAL BONE/MARLENA WO IVCON, CT CERVICAL SPINE WO IVCON CLINICAL HISTORY:  Head neck and facial pain.  Trauma.  Polytrauma TECHNIQUE:  Serial axial unenhanced images were obtained from the  vertex to the foramen magnum. Coronal reconstructions of the brain were performed. Spiral, high resolution axial unenhanced images were obtained from the skull base to the cervicothoracic junction with sagittal and coronal planar reconstructions.  Spiral high resolution axial unenhanced images were also obtained through the facial bones with sagittal and coronal planar reconstructions. MQ:  CTBCSFBWO_3 Dose-Length Product  (DLP): 1580 mGy*cm. CT Dose Reduction Employed: Iterative recon (accession [id number]), Automated exposure control(AEC) and iterative recon (accession [id number]), Automated exposure control(AEC) and iterative recon (accession [id number]) COMPARISON:  None. RESULT: BRAIN: Acute change:   No evidence of an acute contusion or other acute parenchymal process. Hemorrhage:    No evidence of acute intracranial hemorrhage. Mass lesion / Mass effect:   There is no evidence of an intracranial mass or extraaxial fluid collection.  No significant mass effect. Chronic change:   Scattered patchy foci of low attenuation are present within supratentorial white matter which is a nonspecific finding but likely represents mild to moderate microvascular ischemia. Parenchyma:  There is no significant volume loss.  The brain parenchyma   is otherwise within normal limits for age. Ventricles:   The ventricles are within normal limits of size and configuration for age. Paranasal sinuses and skull base:  Mild sphenoid sinus mucosal thickening is present.     The skull base and visualized extracranial soft tissues are grossly normal.  (topogram) images: No additional findings. CERVICAL: Counting reference:  Craniocervical junction.   Anatomic Variant:  None.   Assume 7 cervical vertebrae with counting from the craniocervical junction. Alignment:    Alignment is anatomic.   No acute traumatic subluxation of the cervical spine. Craniocervical junction:    Craniocervical junction is normal. Osseous structures/fracture:    No evidence of a lytic or blastic process in the visualized spine.  No evidence of acute or chronic fracture. Cervical soft tissues:    The paraspinal soft tissues planes are maintained.  No prevertebral soft tissue swelling.  Atherosclerotic vascular calcifications are present involving the carotid bulbs bilaterally. Degenerative changes: Discogenic degenerative changes and posterior facet degenerative changes of the  cervical spine.  Multilevel neural foraminal narrowing.  No significant osseous central canal stenosis.  (topogram) images: No additional findings. FACIAL BONES: Soft Tissues:  No significant superficial soft tissue swelling. Facial bones:  No evidence of an acute fracture in the visualized facial bones. Orbits:  No evidence of an acute fracture.  The globes are intact.  The soft tissue planes of the orbits are maintained. Paranasal Sinuses:  The paranasal sinuses are clear. Foreign Bodies:  No evidence of radio opaque foreign bodies. Other:  No evidence of a remote fracture.  No lytic or blastic process seen in the facial bones.  (topogram) images: No additional findings.    IMPRESSION: No CT evidence of acute intracranial process.  Mild loss and chronic white matter changes present. No evidence of an acute fracture or acute traumatic subluxation of the cervical spine. No CT evidence of acute facial bone fracture. Mucosal thickening present involving the sphenoid sinuses bilaterally. Transcribed Using Voice Recognition Transcribe Date/Time: Jun 24 2024 12:57P Dictated by: GIGI SANTOS MD This examination was interpreted and the report reviewed and electronically signed by: GIGI SANTOS MD on Jun 24 2024  1:03PM  EST    CT head wo IV contrast    Result Date: 4/20/2024  * * *Final Report* * * DATE OF EXAM: Apr 20 2024 12:15PM   [facility]   0504  -  CT BRAIN WO IVCON   / ACCESSION #  [id number] PROCEDURE REASON: Head trauma, moderate-severe      * * * * Physician Interpretation * * * * RESULT: EXAMINATION:  CT BRAIN WO IVCON, CT CERVICAL SPINE WO IVCON CLINICAL HISTORY:  Fall. TECHNIQUE:  Serial axial unenhanced images were obtained from the  vertex to the foramen magnum.  Spiral, high resolution axial unenhanced images were obtained from the skull base to the cervicothoracic junction with sagittal and coronal planar reconstructions. MQ:  CTBCSWO_3 Dose-Length Product (DLP): 1163 mGy*cm. CT Dose Reduction  Employed: Automated exposure control(AEC) and iterative recon COMPARISON:  02/13/2008 CT brain RESULT: BRAIN: Post-operative change:  None. Acute change:   No evidence of an acute contusion or other acute parenchymal process. Hemorrhage:    No evidence of acute intracranial hemorrhage. ECASS hemorrhagic transformation score: Not Applicable Mass Lesion / Mass Effect:   There is no evidence of an intracranial mass or extraaxial fluid collection.  No significant mass effect. Chronic change:   Scattered patchy foci of low attenuation are present within supratentorial white matter which is a nonspecific finding but likely represents mild microvascular ischemia.  Vascular calcifications. Parenchyma:  There is moderate generalized volume loss. Ventricles:   Ventricular enlargement concordant with the degree of parenchymal volume loss. Paranasal sinuses and skull base:  Complete opacification of the left sphenoid sinus with central mildly hyperattenuating material, suggesting chronic sinusitis.  The visualized paranasal sinuses are otherwise   grossly clear.    The skull base and imaged soft tissues are unremarkable. CERVICAL: Counting reference:  Craniocervical junction.   Anatomic Variants:  None. Alignment:    Alignment is anatomic. Craniocervical junction:    Craniocervical junction is normal. Osseous structures/fracture:    No evidence of a lytic or blastic process in the visualized spine.  No evidence of acute or chronic fracture. Cervical soft tissues:    The paraspinal soft tissues planes are maintained. Degenerative changes: Multilevel degenerative change of the cervical spine with multilevel disc space narrowing, endplate spurring, uncovertebral joint hypertrophy, and facet joint arthrosis.  Degenerative changes are most passes at the level of C5-C7 with severe disc space narrowing at C5-C6 and moderate disc space narrowing at C6-C7.  There is multilevel bilateral bony neural foraminal narrowing, most pronounced  at the right C4-C5 and C5-C6 neural foramina with severe bony neural foraminal narrowing.  There is also some multilevel canal narrowing, which appears most pronounced at the C5-C6 level with moderate appearing narrowing.  (topogram) images: No additional significant findings.    IMPRESSION: CT BRAIN: No evidence of an acute intracranial abnormality. Nonspecific chronic left sphenoid sinusitis. Chronic changes, as described. CT CERVICAL SPINE: No evidence of acute fracture or traumatic subluxation of the cervical spine. Multilevel cervical spine degenerative changes, as described. Transcribed Using Voice Recognition Transcribe Date/Time: Apr 20 2024 12:51P Dictated by: TAVO CERVANTES DO This examination was interpreted and the report reviewed and electronically signed by: TAVO CERVANTES DO on Apr 20 2024 12:59PM  EST          1. Post concussion syndrome             New concussion visit.    You have suffered a concussion which is an injury to the brain that takes a variable amount of time to recover.   Relative rest is the best treatment, but physical activity that does not worsen your symptoms can be an important part of recovery.     Your concussion symptoms are likely resolving due to the timing of the head injury.     Your condition is consistent with post concussion syndrome    Your post concussion syndrome is moderate.     Your recovery may be slowed by the risk factors we discussed.    I recommend limiting screen time. No more than 2 hours/day of total screen time is a reasonable amount.      Tylenol or ibuprofen are ok for headaches.     Continue your normal headache medications.       No strenuous physical activity for the time being, such as heavy lifting or intense exercise.     You should avoid activities that place you at risk for sustaining another head injury.    Nurtec sample provided.     Follow up with Dr. Sarkar: 1 month.     If you are not improving by the time you follow up- we will  consider a referral to physical therapy.    If physical therapy and rest do not help you fully recover, another step in the management of your head injury may be a referral to neuropsychology.

## 2024-12-11 ENCOUNTER — APPOINTMENT (OUTPATIENT)
Dept: OTOLARYNGOLOGY | Facility: CLINIC | Age: 79
End: 2024-12-11
Payer: MEDICARE

## 2024-12-18 ENCOUNTER — APPOINTMENT (OUTPATIENT)
Dept: PRIMARY CARE | Facility: CLINIC | Age: 79
End: 2024-12-18
Payer: MEDICARE

## 2024-12-18 VITALS — BODY MASS INDEX: 25.7 KG/M2 | HEIGHT: 69 IN

## 2024-12-18 DIAGNOSIS — F07.81 POST CONCUSSION SYNDROME: Primary | ICD-10-CM

## 2024-12-18 PROCEDURE — 99214 OFFICE O/P EST MOD 30 MIN: CPT | Performed by: FAMILY MEDICINE

## 2024-12-18 PROCEDURE — 1036F TOBACCO NON-USER: CPT | Performed by: FAMILY MEDICINE

## 2024-12-18 PROCEDURE — 1157F ADVNC CARE PLAN IN RCRD: CPT | Performed by: FAMILY MEDICINE

## 2024-12-18 ASSESSMENT — ENCOUNTER SYMPTOMS
DEPRESSION: 0
OCCASIONAL FEELINGS OF UNSTEADINESS: 0
LOSS OF SENSATION IN FEET: 0

## 2024-12-18 NOTE — PROGRESS NOTES
Pt is here for concussion, no concerns.       Pt comes in for a concussion.     Luis Ann presenting to concussion clinic for reevaluation.     Oct 14, 2024- she was getting out of her car getting up to a curb and she fell and his the front left part of her forehead. Her glasses cut her head.  No LOC.   EMS took her to the ER.   CT was done and she was discharged home.        Last visit: 24  - she is not better from a concussion standpoint.     Balance- needs to use assistance with walking around, such as a cane or walker. Balance is worse since the Oct 14th fall. 24 update: if she moves her head too fast, she will feel dizzy. At home she is using her walker only getting out of bed.   Right eye- poor vision since a previous cataract procedure. 24 update: hasn't changed.   Headaches- she needs to wear sunglasses at home. She does have a headache neurologist. Sometimes they last all night long. 24 update: she gets a headache close to when she goes to sleep. A lot of times the headache will go away in the middle of the night, she does take tylenol which seems to be helpful..   Fatigue- she is tired. 24 update: still tired.   Drowsiness- drowsy every afternoon. 24 update: drowsy in the afternoon.   Sadness- due to the worsening of her conditions since her fall. 24 update: still sad.   Falling- she admits to falling history. 24 update: none since the last concussion visit.    Light sensitivity- 24 update: wears sunglasses with ipad and TV use.       PMH: history of skin cancer. NO history of prostate cancer (likely chart error).     Testin24 update: EMG was done last week from neurology and was told that she may have neuropathy.       Work: none  School: none  Sports: walking about 4-5x/week.   Paperwork: no injury report. No police. EMS took her to ER. No attorneys.       Healthcare team:  - neurology- headache  - neurology- balance  - cardiology  -  ENT        Date of current head injury:   - Oct 14, 2024  Previous concussion history:  - none  Imaging for a head injury/concussion:  - CT head 10/14/24: unremarkable  Depression, anxiety, psychiatric disorder, learning disability, dyslexia, ADD/ADHD?:  - depression/anxiety managed by neurology.   - 20 years ago went to [facility] for psychiatric/addiction treatment. 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 And she reports back in the past her internist wrote for percocet.   Headache history:   - migraine history for her whole life, she has a headache neurologist here in [place] and also in [place].         Concussion symptoms: severity 0 to 6    Headache 4  Pressure in head 4  Neck pain 0  Nausea or vomiting 0  Dizziness 6  Blurred vision 6  Balance problems 6  Sensitivity to light 6  Sensitivity to noise 4  Feeling slowed down 6  Feeling like in a fog 6  Don't feel right 6  Difficulty concentrating 6  Difficulty remembering 5  Fatigue or low energy 6  Confusion 6  Drowsiness 6  More emotional 6  Irritability 6  Sadness 6  Nervous or Anxious 6  Trouble falling asleep 0    There were no vitals taken for this visit.     Gen: NAD, Alert and oriented x3  Head: no specific areas of ttp; no step offs  Face: no specific areas of ttp; no step offs  Neck: no posterior ttp.    Psych: mood pleasant and appropriate  Resp: good respiratory effort  Neurologic: CN II-XII grossly intact. Strength and sensation symmetric and intact throughout all 4 extremities. DTR 2+ in all 4 extremities. Cerebellar testing normal. Negative Rhomberg's test. No dysdiadochokinesis.  Gait: normal        CT head wo IV contrast, CT cervical spine wo IV contrast  Narrative: Interpreted By:  Meng Sparks,   STUDY:  CT HEAD WO IV CONTRAST; CT CERVICAL SPINE WO IV CONTRAST;  10/14/2024  4:38 pm      INDICATION:  Signs/Symptoms:fall head strike      COMPARISON:  03/07/2020      ACCESSION NUMBER(S):  [id number];  [id number]      ORDERING CLINICIAN:  STEFFEN SIMERLINK      TECHNIQUE:  Axial noncontrast CT images of head with coronal and sagittal  reconstructed images. Axial noncontrast CT images of the cervical  spine with coronal and sagittal reconstructed images.      FINDINGS:  CT HEAD:      BRAIN PARENCHYMA:  No evidence of acute intraparenchymal hemorrhage  or parenchymal evidence of acute large territory ischemic infarct. No  mass-effect, midline shift or effacement of cerebral sulci.  Gray-white matter distinction is preserved. Global volume loss and  chronic small vessel ischemic change      VENTRICLES and EXTRA-AXIAL SPACES:  No acute extra-axial or  intraventricular hemorrhage. Ventricles and sulci are age-concordant.      PARANASAL SINUSES/MASTOIDS:  No hemorrhage or air-fluid levels within  the visualized paranasal sinuses. The mastoid air cells are  well-aerated.      CALVARIUM/ORBITS:  No skull fracture.  The orbits and globes are  intact to the extent visualized.      EXTRACRANIAL SOFT TISSUES: No discernible abnormality.          CT CERVICAL SPINE:      PREVERTEBRAL SOFT TISSUES: Within normal limits.      CRANIOCERVICAL JUNCTION: Intact.      ALIGNMENT:  No traumatic malalignment or traumatic facet widening.      VERTEBRAE:  No acute fracture. Vertebral body heights are maintained.      Mild degenerative changes detected. This is most notable at C5-6      OTHER: Vascular calcification      Impression: CT HEAD:  No acute intracranial abnormality or calvarial fracture.  Senescent changes      CT CERVICAL SPINE:  No acute fracture or traumatic malalignment of the cervical spine.  Mild multilevel degenerative disc disease. Vascular calcification      Signed by: Meng Sparks 10/14/2024 5:21 PM  Dictation workstation:   [id number]      No MRI head results found for the past 12 months     CT head wo IV contrast    Result Date: 10/14/2024  Interpreted By:  Meng Sparks, STUDY: CT HEAD WO IV CONTRAST; CT  CERVICAL SPINE WO IV CONTRAST;  10/14/2024 4:38 pm   INDICATION: Signs/Symptoms:fall head strike   COMPARISON: 03/07/2020   ACCESSION NUMBER(S): [id number]; [id number]   ORDERING CLINICIAN: STEFFEN SIMERLINK   TECHNIQUE: Axial noncontrast CT images of head with coronal and sagittal reconstructed images. Axial noncontrast CT images of the cervical spine with coronal and sagittal reconstructed images.   FINDINGS: CT HEAD:   BRAIN PARENCHYMA:  No evidence of acute intraparenchymal hemorrhage or parenchymal evidence of acute large territory ischemic infarct. No mass-effect, midline shift or effacement of cerebral sulci. Gray-white matter distinction is preserved. Global volume loss and chronic small vessel ischemic change   VENTRICLES and EXTRA-AXIAL SPACES:  No acute extra-axial or intraventricular hemorrhage. Ventricles and sulci are age-concordant.   PARANASAL SINUSES/MASTOIDS:  No hemorrhage or air-fluid levels within the visualized paranasal sinuses. The mastoid air cells are well-aerated.   CALVARIUM/ORBITS:  No skull fracture.  The orbits and globes are intact to the extent visualized.   EXTRACRANIAL SOFT TISSUES: No discernible abnormality.     CT CERVICAL SPINE:   PREVERTEBRAL SOFT TISSUES: Within normal limits.   CRANIOCERVICAL JUNCTION: Intact.   ALIGNMENT:  No traumatic malalignment or traumatic facet widening.   VERTEBRAE:  No acute fracture. Vertebral body heights are maintained.   Mild degenerative changes detected. This is most notable at C5-6   OTHER: Vascular calcification       CT HEAD: No acute intracranial abnormality or calvarial fracture. Senescent changes   CT CERVICAL SPINE: No acute fracture or traumatic malalignment of the cervical spine. Mild multilevel degenerative disc disease. Vascular calcification   Signed by: Meng Sparks 10/14/2024 5:21 PM Dictation workstation:   [id number]    CT head wo IV contrast    Result Date: 6/24/2024  * * *Final Report* * * DATE OF EXAM: Jun 24 2024  11:30AM   [facility]   0504  -  CT BRAIN WO IVCON   / ACCESSION #  [id number] PROCEDURE REASON: Head trauma, moderate-severe      * * * * Physician Interpretation * * * * RESULT: EXAMINATION:  CT BRAIN WO IVCON, CT FACIAL BONE/MARLENA WO IVCON, CT CERVICAL SPINE WO IVCON CLINICAL HISTORY:  Head neck and facial pain.  Trauma.  Polytrauma TECHNIQUE:  Serial axial unenhanced images were obtained from the  vertex to the foramen magnum. Coronal reconstructions of the brain were performed. Spiral, high resolution axial unenhanced images were obtained from the skull base to the cervicothoracic junction with sagittal and coronal planar reconstructions.  Spiral high resolution axial unenhanced images were also obtained through the facial bones with sagittal and coronal planar reconstructions. MQ:  CTBCSFBWO_3 Dose-Length Product (DLP): 1580 mGy*cm. CT Dose Reduction Employed: Iterative recon (accession [id number]), Automated exposure control(AEC) and iterative recon (accession [id number]), Automated exposure control(AEC) and iterative recon (accession [id number]) COMPARISON:  None. RESULT: BRAIN: Acute change:   No evidence of an acute contusion or other acute parenchymal process. Hemorrhage:    No evidence of acute intracranial hemorrhage. Mass lesion / Mass effect:   There is no evidence of an intracranial mass or extraaxial fluid collection.  No significant mass effect. Chronic change:   Scattered patchy foci of low attenuation are present within supratentorial white matter which is a nonspecific finding but likely represents mild to moderate microvascular ischemia. Parenchyma:  There is no significant volume loss.  The brain parenchyma   is otherwise within normal limits for age. Ventricles:   The ventricles are within normal limits of size and configuration for age. Paranasal sinuses and skull base:  Mild sphenoid sinus mucosal thickening is present.     The skull base and visualized extracranial soft tissues are grossly normal.   (topogram) images: No additional findings. CERVICAL: Counting reference:  Craniocervical junction.   Anatomic Variant:  None.   Assume 7 cervical vertebrae with counting from the craniocervical junction. Alignment:    Alignment is anatomic.   No acute traumatic subluxation of the cervical spine. Craniocervical junction:    Craniocervical junction is normal. Osseous structures/fracture:    No evidence of a lytic or blastic process in the visualized spine.  No evidence of acute or chronic fracture. Cervical soft tissues:    The paraspinal soft tissues planes are maintained.  No prevertebral soft tissue swelling.  Atherosclerotic vascular calcifications are present involving the carotid bulbs bilaterally. Degenerative changes: Discogenic degenerative changes and posterior facet degenerative changes of the cervical spine.  Multilevel neural foraminal narrowing.  No significant osseous central canal stenosis.  (topogram) images: No additional findings. FACIAL BONES: Soft Tissues:  No significant superficial soft tissue swelling. Facial bones:  No evidence of an acute fracture in the visualized facial bones. Orbits:  No evidence of an acute fracture.  The globes are intact.  The soft tissue planes of the orbits are maintained. Paranasal Sinuses:  The paranasal sinuses are clear. Foreign Bodies:  No evidence of radio opaque foreign bodies. Other:  No evidence of a remote fracture.  No lytic or blastic process seen in the facial bones.  (topogram) images: No additional findings.    IMPRESSION: No CT evidence of acute intracranial process.  Mild loss and chronic white matter changes present. No evidence of an acute fracture or acute traumatic subluxation of the cervical spine. No CT evidence of acute facial bone fracture. Mucosal thickening present involving the sphenoid sinuses bilaterally. Transcribed Using Voice Recognition Transcribe Date/Time: Jun 24 2024 12:57P Dictated by: GIGI SANTOS MD This  examination was interpreted and the report reviewed and electronically signed by: GIGI SANTOS MD on Jun 24 2024  1:03PM  EST    CT head wo IV contrast    Result Date: 4/20/2024  * * *Final Report* * * DATE OF EXAM: Apr 20 2024 12:15PM   [facility]   0504  -  CT BRAIN WO IVCON   / ACCESSION #  [id number] PROCEDURE REASON: Head trauma, moderate-severe      * * * * Physician Interpretation * * * * RESULT: EXAMINATION:  CT BRAIN WO IVCON, CT CERVICAL SPINE WO IVCON CLINICAL HISTORY:  Fall. TECHNIQUE:  Serial axial unenhanced images were obtained from the  vertex to the foramen magnum.  Spiral, high resolution axial unenhanced images were obtained from the skull base to the cervicothoracic junction with sagittal and coronal planar reconstructions. MQ:  CTBCSWO_3 Dose-Length Product (DLP): 1163 mGy*cm. CT Dose Reduction Employed: Automated exposure control(AEC) and iterative recon COMPARISON:  02/13/2008 CT brain RESULT: BRAIN: Post-operative change:  None. Acute change:   No evidence of an acute contusion or other acute parenchymal process. Hemorrhage:    No evidence of acute intracranial hemorrhage. ECASS hemorrhagic transformation score: Not Applicable Mass Lesion / Mass Effect:   There is no evidence of an intracranial mass or extraaxial fluid collection.  No significant mass effect. Chronic change:   Scattered patchy foci of low attenuation are present within supratentorial white matter which is a nonspecific finding but likely represents mild microvascular ischemia.  Vascular calcifications. Parenchyma:  There is moderate generalized volume loss. Ventricles:   Ventricular enlargement concordant with the degree of parenchymal volume loss. Paranasal sinuses and skull base:  Complete opacification of the left sphenoid sinus with central mildly hyperattenuating material, suggesting chronic sinusitis.  The visualized paranasal sinuses are otherwise   grossly clear.    The skull base and imaged soft tissues are unremarkable.  CERVICAL: Counting reference:  Craniocervical junction.   Anatomic Variants:  None. Alignment:    Alignment is anatomic. Craniocervical junction:    Craniocervical junction is normal. Osseous structures/fracture:    No evidence of a lytic or blastic process in the visualized spine.  No evidence of acute or chronic fracture. Cervical soft tissues:    The paraspinal soft tissues planes are maintained. Degenerative changes: Multilevel degenerative change of the cervical spine with multilevel disc space narrowing, endplate spurring, uncovertebral joint hypertrophy, and facet joint arthrosis.  Degenerative changes are most passes at the level of C5-C7 with severe disc space narrowing at C5-C6 and moderate disc space narrowing at C6-C7.  There is multilevel bilateral bony neural foraminal narrowing, most pronounced at the right C4-C5 and C5-C6 neural foramina with severe bony neural foraminal narrowing.  There is also some multilevel canal narrowing, which appears most pronounced at the C5-C6 level with moderate appearing narrowing.  (topogram) images: No additional significant findings.    IMPRESSION: CT BRAIN: No evidence of an acute intracranial abnormality. Nonspecific chronic left sphenoid sinusitis. Chronic changes, as described. CT CERVICAL SPINE: No evidence of acute fracture or traumatic subluxation of the cervical spine. Multilevel cervical spine degenerative changes, as described. Transcribed Using Voice Recognition Transcribe Date/Time: Apr 20 2024 12:51P Dictated by: TAVO CERVANTES DO This examination was interpreted and the report reviewed and electronically signed by: TAVO CERVANTES DO on Apr 20 2024 12:59PM  EST          1. Post concussion syndrome                Your concussion symptoms are likely resolving due to the timing of the head injury.     Your condition is consistent with post concussion syndrome    Your post concussion syndrome is still moderate.     Your recovery may be slowed by the  risk factors we discussed.    I recommend limiting screen time. No more than 2 hours/day of total screen time is a reasonable amount.      Tylenol or ibuprofen are ok for headaches.     Continue your normal headache medications.       No strenuous physical activity for the time being, such as heavy lifting or intense exercise.     You should avoid activities that place you at risk for sustaining another head injury.    Nurtec sample provided- 12/18/24 update: she didn't take it.     Follow up with Dr. Sarkar: 1 month.       I ordered Physical Therapy today. Consider calling to schedule at the following location:  [place] [address]  phone number [phone].  Consider: Brenda Maya, PT    For physical therapy, also consider:    [facility] [place] [address]  Phone: [phone]  Clinicians to request:  Nelly Rojas, PT, DPT, SCS                                             Prerna Alcazar, PT, MPT, SCS     Additional  Vestibular Therapy on the east side:  [facility], Valentin Rivera #[phone]    [facility], Kiana Alberto #[phone]    [facility], Eduar Oliveros #[phone]    [facility], Mary Aguilera #[phone]     [place], Rehab at the , José Miguel Viera [phone]    [facility], Antonio Arguelles #[phone]    [facility], Elif Cota #[phone]        If physical therapy and rest do not help you fully recover, another step in the management of your head injury may be a referral to neuropsychology.

## 2024-12-30 ENCOUNTER — OFFICE VISIT (OUTPATIENT)
Dept: OBSTETRICS AND GYNECOLOGY | Facility: CLINIC | Age: 79
End: 2024-12-30
Payer: MEDICARE

## 2024-12-30 VITALS
HEIGHT: 69 IN | SYSTOLIC BLOOD PRESSURE: 107 MMHG | WEIGHT: 172.8 LBS | DIASTOLIC BLOOD PRESSURE: 69 MMHG | HEART RATE: 69 BPM | BODY MASS INDEX: 25.59 KG/M2

## 2024-12-30 DIAGNOSIS — N32.81 OVERACTIVE BLADDER: Primary | ICD-10-CM

## 2024-12-30 LAB
POC APPEARANCE, URINE: CLEAR
POC BILIRUBIN, URINE: NEGATIVE
POC BLOOD, URINE: ABNORMAL
POC COLOR, URINE: ABNORMAL
POC GLUCOSE, URINE: NEGATIVE MG/DL
POC KETONES, URINE: NEGATIVE MG/DL
POC LEUKOCYTES, URINE: NEGATIVE
POC NITRITE,URINE: NEGATIVE
POC PH, URINE: 5.5 PH
POC PROTEIN, URINE: ABNORMAL MG/DL
POC SPECIFIC GRAVITY, URINE: >=1.03
POC UROBILINOGEN, URINE: 0.2 EU/DL

## 2024-12-30 PROCEDURE — 1036F TOBACCO NON-USER: CPT | Performed by: OBSTETRICS & GYNECOLOGY

## 2024-12-30 PROCEDURE — 3078F DIAST BP <80 MM HG: CPT | Performed by: OBSTETRICS & GYNECOLOGY

## 2024-12-30 PROCEDURE — 99204 OFFICE O/P NEW MOD 45 MIN: CPT | Performed by: OBSTETRICS & GYNECOLOGY

## 2024-12-30 PROCEDURE — 1126F AMNT PAIN NOTED NONE PRSNT: CPT | Performed by: OBSTETRICS & GYNECOLOGY

## 2024-12-30 PROCEDURE — 1159F MED LIST DOCD IN RCRD: CPT | Performed by: OBSTETRICS & GYNECOLOGY

## 2024-12-30 PROCEDURE — 3074F SYST BP LT 130 MM HG: CPT | Performed by: OBSTETRICS & GYNECOLOGY

## 2024-12-30 PROCEDURE — 99214 OFFICE O/P EST MOD 30 MIN: CPT | Mod: 25 | Performed by: OBSTETRICS & GYNECOLOGY

## 2024-12-30 PROCEDURE — 51798 US URINE CAPACITY MEASURE: CPT | Performed by: OBSTETRICS & GYNECOLOGY

## 2024-12-30 PROCEDURE — 1157F ADVNC CARE PLAN IN RCRD: CPT | Performed by: OBSTETRICS & GYNECOLOGY

## 2024-12-30 PROCEDURE — 81003 URINALYSIS AUTO W/O SCOPE: CPT | Mod: QW | Performed by: OBSTETRICS & GYNECOLOGY

## 2024-12-30 RX ORDER — TROSPIUM CHLORIDE 20 MG/1
TABLET, FILM COATED ORAL
Qty: 60 TABLET | Refills: 3 | Status: SHIPPED | OUTPATIENT
Start: 2024-12-30

## 2024-12-30 RX ORDER — TRAZODONE HYDROCHLORIDE 100 MG/1
TABLET ORAL
COMMUNITY
Start: 2024-11-25

## 2024-12-30 RX ORDER — TIZANIDINE 2 MG/1
TABLET ORAL
COMMUNITY
Start: 2024-06-24

## 2024-12-30 ASSESSMENT — ENCOUNTER SYMPTOMS
CARDIOVASCULAR NEGATIVE: 1
EYES NEGATIVE: 1
RESPIRATORY NEGATIVE: 1
ENDOCRINE NEGATIVE: 1
PSYCHIATRIC NEGATIVE: 1
NEUROLOGICAL NEGATIVE: 1
GASTROINTESTINAL NEGATIVE: 1
FREQUENCY: 1
CONSTITUTIONAL NEGATIVE: 1

## 2024-12-30 ASSESSMENT — PAIN SCALES - GENERAL: PAINLEVEL_OUTOF10: 0-NO PAIN

## 2024-12-30 NOTE — PROGRESS NOTES
Urogynecology  Provider:  Henrietta Ojeda MD  [phone]      ASSESSMENT AND PLAN:   79 year old female with OAB. Comorbidities include: HTN, lung sarcoidosis, GERD, and MCI.     Diagnoses:  #1 Overactive bladder    Plan:  1. OAB, UUI  - PVR = 0mL by bladder U/S.  - Previously saw Dr. Maritza Lazo @ [facility] in June 2024 and reported nocturia 3x/night without any daytime symptoms at that time. They did not start her on any OAB medication at that time but rather discussed fluid drinking habits and was recommended to see Dr. Gonzales for a sleep study. However, her OAB symptoms have worsened since that time to the point she wears daily pads now for UUI leakage.   - We discussed OAB lifestyle changes (i.e., trying to limit fluids to 60oz in total per day, timed voiding every 2-3 hours, stop drinking fluids 3 hours prior to bedtime, and avoiding/limiting bladder irritants such as caffeine, nicotine, artificial sweeteners, acidic/spicy foods, and alcohol).  - Discussed OAB treatment options such as lifestyle changes, PFPT, medications, PTNS, intradetrusor Botox injections, or SNM.   - We discussed that PFPT may help with bladder/pelvic floor restrengthening exercises with an overall goal to better control the bladder and improve urinary symptoms. PFPT includes attending sessions with a specialized licensed female pelvic floor physical therapist who does external with internal vaginal work to ensure she is doing the correct exercises to obtain the most benefit of physical therapy. We reviewed the importance of continuing these home exercises to receive maximum benefit from PFPT. They also teach mind over bladder strategies/urge suppression techniques to reduce the intensity of the urge to void.   - We discussed that with starting an OAB medication the goal would be to allow the detrusor muscle around the bladder to relax and prevent the muscles from spasm/squeezing too frequently to allow the bladder to store more urine  which reduces the urge, frequency, and incontinent episodes.   - She is amenable to starting an OAB mediation (Trospium 20mg BID or PRN) as this is cost-effective at $5/month. We will defer starting her on Myrbetriq as it is estimated to be $260/month which is cost-prohibitive. If this medication does not improve her OAB symptoms she is open to considering proceeding trying intradetrusor Botox injections.   - Sent Rx of Trospium 20mg to her preferred pharmacy with instructions to take half to one pill po BID or PRN basis. This medication will help improve bladder compliance by decreasing intensity of the bladder spasms thus improving urinary urgency. Discussed the drug profile being an anticholinergic and possible medication side effects including dry mouth, dry eyes, and constipation. We particularly recommended she take the tablet at bedtime as she is mostly bothered by nighttime urinary symptoms.   - Discussed possible side effects and dosing schedule in detail    Follow up in 6-8 weeks with SHARON Howard for an OAB medication check.     Scribe Attestation  By signing my name below, I, Paulie Quispe, Soha, attest that this documentation has been prepared under the direction and in the presence of Henrietta Ojeda MD on 12/30/2024 at 6:20 PM.     Agree with above. I Dr. Ojeda, personally performed the services described in the documentation which was scribed virtually and confirm it is both complete and accurate.  Henrietta Ojeda MD      Problem List Items Addressed This Visit    None          I spent a total of 45 minutes in face to face and non face to face time.      Henrietta Ojeda MD        HISTORY OF PRESENT ILLNESS:   79 year old female presenting as a new patient for evaluation of urinary frequency.      Urinary Symptoms:   - The patient reports experiencing urinary frequency and wears Depends for UUI related leakage.   - She has some UUI during the day sometimes described as  leaking drops of urine rarely with urgency.   - Patient wears Depends at night for UUI leakage and has UUI on her way to the bathroom in the mornings that fully saturates her Depends.   - OAB symptoms began 1-2 years ago but her urinary symptoms have recently worsened with now having to wear daily pads.   - Previously saw Dr. Nicolasa Lazo @ [facility] in 2024 and reported nocturia 3x/night without any daytime symptoms at that time. They did not start her on any OAB medication at that time but rather discussed fluid drinking habits and was recommended to see Dr. Gonzales for a sleep study. However, her OAB symptoms have worsened since that time to the point she wears daily pads now for UUI leakage.   - Primarily bothered by nighttime OAB symptoms but does have frequency during the day with feeling urge to void when she passes by a bathroom.  - She has tried limiting fluids by stopping drinking at 6 or 7pm in the evenings. However, she drinks fluids with dinner. Of note, she has dry mouth at baseline.     OBGYN History and Sexual Activity:   - , x1 EAB  - Denies any prior hx of abnormal pap smears.   - No prior hysterectomy; still has her uterus.        Past Medical History:     Past Medical History:   Diagnosis Date    Asthma     Cataract     Chronic sphenoidal sinusitis     Colon polyp     COPD (chronic obstructive pulmonary disease) (Multi)     Coronary artery disease     Depression     Epistaxis     GERD (gastroesophageal reflux disease)     Hyperlipidemia     Hypertension     Lumbar disc disease     Migraine     Nasal lesion     Non-rheumatic mitral regurgitation     Nonrheumatic aortic (valve) insufficiency     Osteoporosis     Peripheral neuropathy     Pulmonary nodules     Pulmonary sarcoidosis (Multi)     in remission    Spinal stenosis     lumbar    Vision loss     right eye        Past Surgical History:     Past Surgical History:   Procedure Laterality Date    ACHILLES TENDON SURGERY Left      ACHILLES TENDON SURGERY Right     CATARACT EXTRACTION  2017    COLONOSCOPY  2021    CT ANGIO CORONARY ART WITH HEARTFLOW IF SCORE >30%  08/10/2017    CT HEART CORONARY ANGIOGRAM 8/10/2017 [facility]    CT ANGIO CORONARY ART WITH HEARTFLOW IF SCORE >30%  10/07/2022    CT HEART CORONARY ANGIOGRAM 10/7/2022 [facility]    EYE SURGERY Right     infection    FOOT SURGERY Right     HEMORRHOID SURGERY      LUMBAR LAMINECTOMY  03/2022    MOUTH SURGERY      Tooth Extraction    NASAL TURBINATE REDUCTION      RHINOPLASTY      SPINE SURGERY  10/2022    WRIST SURGERY Left 07/2021       Medications:     Prior to Admission medications    Medication Sig Start Date End Date Taking? Authorizing Provider   acetaminophen (Tylenol 8 HOUR) 650 mg ER tablet Take 1 tablet (650 mg) by mouth every 8 hours if needed for mild pain (1 - 3). Do not crush, chew, or split.    Historical Provider, MD   amLODIPine (Norvasc) 5 mg tablet TAKE ONE TABLET BY MOUTH ONCE DAILY 10/21/24   Rea Ramirez MD   aspirin (Adult Low Dose Aspirin) 81 mg EC tablet Take 1 tablet (81 mg) by mouth.    Historical Provider, MD   atorvastatin (Lipitor) 40 mg tablet Take 1 tablet (40 mg) by mouth once daily for 360 doses. 3/18/24 3/13/25  Royer Gonzales MD MPH   budesonide-formoteroL (Symbicort) 160-4.5 mcg/actuation inhaler Inhale 2 puffs 2 times a day. Rinse mouth with water after use to reduce aftertaste and incidence of candidiasis. Do not swallow. 7/31/24 1/27/25  Royer Gonzales MD MPH   calcium carbonate-vitamin D3 500 mg-5 mcg (200 unit) tablet Take 1 tablet by mouth once daily.    Historical Provider, MD   cloNIDine (Catapres) 0.1 mg tablet Take 1 tablet (0.1 mg) by mouth once daily. 1/23/24 1/22/25  Rea Ramirez MD   escitalopram (Lexapro) 20 mg tablet Take 1 tablet (20 mg) by mouth once daily. 8/28/08   Historical Provider, MD   ezetimibe (Zetia) 10 mg tablet TAKE 1 TABLET BY MOUTH ONCE DAILY AT BEDTIME 8/29/24   Royer Gonzales MD MPH    gabapentin (Neurontin) 300 mg capsule Take 1 capsule (300 mg) by mouth once daily at bedtime. 6/9/22   Historical Provider, MD   lamoTRIgine (LaMICtal) 200 mg tablet Take 0.5 tablets (100 mg) by mouth 2 times a day. 1/15/22   Historical Provider, MD   losartan (Cozaar) 100 mg tablet Take 1 tablet (100 mg) by mouth once daily. 11/11/24 11/11/25  Rea Ramirez MD   montelukast (Singulair) 10 mg tablet Take 1 tablet (10 mg) by mouth once daily. as directed 3/18/24   Royer Gonzales MD MPH   prochlorperazine (Compazine) 10 mg tablet Take 0.5 tablets (5 mg) by mouth every 6 hours if needed for nausea or vomiting. 10/14/24   Steffen Simerlink, MD   triamterene-hydrochlorothiazid (Dyazide) 37.5-25 mg capsule Take 1 capsule by mouth once daily in the morning. 10/1/24 10/1/25  Lonnie Turcios MD        ROS  Review of Systems   Constitutional: Negative.    HENT: Negative.     Eyes: Negative.    Respiratory: Negative.     Cardiovascular: Negative.    Gastrointestinal: Negative.    Endocrine: Negative.    Genitourinary:  Positive for enuresis, frequency and urgency.   Neurological: Negative.    Psychiatric/Behavioral: Negative.            PHYSICAL EXAM:    There were no vitals taken for this visit.  No LMP recorded. Patient is postmenopausal.      Declines chaperone for physical exam.    PVR = 0mL by bladder U/S    Well developed, well nourished, in no apparent distress.   Neurologic/Psychiatric:  Awake, Alert and Oriented times 3.  Affect normal. Normal cranial nerves  Pulm: breathing without effort  Sexual maturity: Lambert stage V  Abd exam: soft, non-tender      GENITAL/URINARY:     External Genitalia:  The patient has normal appearing external genitalia, normal skenes and bartholins glands, and a normal hair distribution.  Her vulva is without lesions, erythema or discharge.  It is non-tender with appropriate sensation.     Urethral Meatus:  Size normal, Location normal, Lesions absent, Prolapse absent.     Urethra:   Fullness absent, Masses absent.     Bladder:  Fullness absent, Masses absent, Tenderness absent.     Vagina:  General appearance normal, Discharge absent, Lesions absent. Moderate vaginal atrophy.      Cervix: Normal, no discharge.   Uterus:  normal size, mobile, and nontender  Adnexa:   no masses or tenderness over the bilateral adnexa    Anus/Perineum:  Lesions absent and masses absent. Normal appearing anus and perineum.     Stress urinary incontinence not demonstrable.       POP-Q  The patient has Stage 0 Prolapse.    POP-Q:  Stage: 0  Position: supine lithotomy    Aa: -3       Ba:  C: -8   Gh:  Pb:  TVL: 10         Ap: -3 Bp:  D: -9             The patient has 2 out of 5 pelvic floor muscle strength.    She does not have myofascial tenderness on exam.   Her highest pain score on exam is 1        Rectal exam: Deferred.       Henrietta Ojeda MD

## 2025-01-02 ENCOUNTER — APPOINTMENT (OUTPATIENT)
Dept: PRIMARY CARE | Facility: CLINIC | Age: 80
End: 2025-01-02
Payer: MEDICARE

## 2025-01-03 DIAGNOSIS — I10 ESSENTIAL HYPERTENSION: ICD-10-CM

## 2025-01-06 RX ORDER — CLONIDINE HYDROCHLORIDE 0.1 MG/1
0.1 TABLET ORAL DAILY
Qty: 90 TABLET | Refills: 3 | Status: SHIPPED | OUTPATIENT
Start: 2025-01-06

## 2025-01-08 ENCOUNTER — APPOINTMENT (OUTPATIENT)
Dept: OTOLARYNGOLOGY | Facility: CLINIC | Age: 80
End: 2025-01-08
Payer: MEDICARE

## 2025-01-16 ENCOUNTER — OFFICE VISIT (OUTPATIENT)
Dept: CARDIOLOGY | Facility: HOSPITAL | Age: 80
End: 2025-01-16
Payer: MEDICARE

## 2025-01-16 VITALS
WEIGHT: 165 LBS | DIASTOLIC BLOOD PRESSURE: 68 MMHG | HEART RATE: 62 BPM | SYSTOLIC BLOOD PRESSURE: 118 MMHG | BODY MASS INDEX: 24.44 KG/M2 | HEIGHT: 69 IN

## 2025-01-16 DIAGNOSIS — E78.5 DYSLIPIDEMIA, GOAL LDL BELOW 70: ICD-10-CM

## 2025-01-16 DIAGNOSIS — I25.10 CORONARY ARTERY DISEASE INVOLVING NATIVE CORONARY ARTERY OF NATIVE HEART WITHOUT ANGINA PECTORIS: ICD-10-CM

## 2025-01-16 DIAGNOSIS — I10 PRIMARY HYPERTENSION: Primary | ICD-10-CM

## 2025-01-16 PROCEDURE — G2211 COMPLEX E/M VISIT ADD ON: HCPCS | Performed by: INTERNAL MEDICINE

## 2025-01-16 PROCEDURE — 1160F RVW MEDS BY RX/DR IN RCRD: CPT | Performed by: INTERNAL MEDICINE

## 2025-01-16 PROCEDURE — 3078F DIAST BP <80 MM HG: CPT | Performed by: INTERNAL MEDICINE

## 2025-01-16 PROCEDURE — 99214 OFFICE O/P EST MOD 30 MIN: CPT | Performed by: INTERNAL MEDICINE

## 2025-01-16 PROCEDURE — 1157F ADVNC CARE PLAN IN RCRD: CPT | Performed by: INTERNAL MEDICINE

## 2025-01-16 PROCEDURE — 1159F MED LIST DOCD IN RCRD: CPT | Performed by: INTERNAL MEDICINE

## 2025-01-16 PROCEDURE — 1036F TOBACCO NON-USER: CPT | Performed by: INTERNAL MEDICINE

## 2025-01-16 PROCEDURE — 3074F SYST BP LT 130 MM HG: CPT | Performed by: INTERNAL MEDICINE

## 2025-01-16 ASSESSMENT — ENCOUNTER SYMPTOMS
DEPRESSION: 0
OCCASIONAL FEELINGS OF UNSTEADINESS: 1
LOSS OF SENSATION IN FEET: 0

## 2025-01-16 NOTE — PROGRESS NOTES
Primary Care Physician: Royer Gonzales MD MPH      Date of Visit: 01/16/2025 11:40 AM EST  Location of visit: [facility]   Type of Visit: Established Patient     HPI / Summary:   Patient is a 79 year old woman with HTN, hyperlipidemia and known sarcoidosis ( lung involvement) with moderate airflow obstruction and mild restriction with moderate LAD stenosis by CTA with heart flow in 2017, later found to have significant stenosis of a small D1 by cCTA in 2022  to have medical therapy  who returns for follow up      CAD risk factors: + HTN, hyperlipidemia, no DM, no FHx CAD, never smoked  prior cardiac work up ( Reggie hicks 2017) :  + Stress EKG for SOB.   Aug 10 2017: dense calcified plaque in the proximal LAD with 50-70% stenosis. FFR was negative ( lowest value 0.82) Other vessels without significant stenoses.   Decision to pursue medical management.   Patient with h/o lung sarcoid- had significant shortness of breath with the sarcoid, however feels that after having the sarcoid treated her breathing was markedly improved until the summer of 2020 when she noted progressive ROBLERO. She also some SOB at rest. She denied orthopnea PND or LE edema. She got SOB with minimal exertion and fell on her left side. SInce then she had constant left sided chest pain, but later noted exertional chest pressure. Noted increased HR when exerting herself. Noted palpitations on exertion and also with anxiety.      Work up included cardiac MRI 10/6/2020: mildly dilated LV with normal LV systolic function with LVEF 45  %, no fibrosis infiltration or scarring. Moderately dilated LA. Specifically no evidence for cardiac sarcoid  Cardiopulmonary stress test (CPET) Sept 30,2020: patient was hypertensive, blunted HR response ( from 66 to 92 bpm, no evidence of pulmonary limitation overall consistent with deconditioning.   Cardiac Monitor ( 2 WEEK) October 2020: HR 52 to 90 bpm avg 64bpm, 144 VEs SVEs 15,   Cardiac cath ( [facility])  9/15/2020: LAD prox 40-50%, Mid 20-30%, LM < 30%, RCA normal, LCx < 30%.   Renal artery ultrasound 9/23/2020: no renal artery stenosis     Prior to back surgery had CTA with heart flow October 10 2022: prox LAD 50-75% D2 25-50%, LCx 25%, RCA no atherosclerosis. Most places FFR > 0.8. D1 with FFR 0.74 proximally consistent with hemodynamically sifgnificant lesion. However having reviewed study with cardiologist reading exam- D1 is very small vessel ( 3mm) so even though lesion was significant, the vessel is small with a very small territory. Medical management was indicated and there was no indication for cath given small size of vessel.     n October pt called office due to uncontrolled BP. Was started on triamterene/hydrochlorothiazide- since then BP better controlled running 120s-130s/70smmHg with HR 60s-70s bpm. Her dizziness has resolved and she has no CP or SOB. Does have minimal ROBLERO thoug improved. Is walking 1.5 miles at mall 3-4 x per week. Has balance issues/ issues with clumsiness, occasionally dragging her right leg. Was having frequent falls ( no dizziness presyncope or syncope) but much better now that using her walker. Continues to work with her neurologist ( has neuropathy). Has no palpitations and has no myalgias or muscle weakness from statin.        ROS: Full 10 pt review of symptoms of negative unless discussed above.     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)    Prostate cancer (Multi)    Abnormal cardiovascular stress test    Acquired hallux limitus of left foot    Altitude sickness    Anal fissure    Asthmatic bronchitis (HHS-HCC)    Breast complaint    Cataract    Cervical spondylosis    Chronic non-seasonal allergic rhinitis    Chronic obstructive  pulmonary disease (Multi)    Chronotropic incompetence    Closed fracture of distal end of radius    Closed transverse fracture of left patella    CME (cystoid macular edema), right    Cold sore    Herpes labialis    Decreased activity tolerance    Degeneration of lumbar or lumbosacral intervertebral disc    Dense breast tissue on mammogram    Dry eye syndrome of left eye    Endophthalmitis    Flatulence/gas pain/belching    Fracture of bone    Frequent falls    Gastroesophageal reflux disease without esophagitis    H/O adenomatous polyp of colon    Heart palpitations    History of depression    Dyslipidemia, goal LDL below 70    Hypokalemia    Infection due to yeast    Intraocular pressure increase, right    Ischemia of retina of right eye    Left carpal tunnel syndrome    Left ventricular hypertrophy by electrocardiogram    Lip laceration    Loss of height    Low back pain    Lumbar disc disease with radiculopathy    Lumbar stenosis with neurogenic claudication    Migraine without aura    Mild cognitive impairment    Mood disorder in full remission (CMS-HCC)    Non-melanoma skin cancer    Non-rheumatic mitral regurgitation    Nonrheumatic aortic valve insufficiency    Osteoporosis    Pain in joint of right knee    Partial blindness    Patellar fracture    PCO (posterior capsular opacification), left    Peripheral neuropathy    Achilles tendinitis, left leg    Acute cystitis without hematuria    Postoperative pain    Pseudophakia of both eyes    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)    Multiple pulmonary nodules    Foot pain    S/P lumbar fusion    Inflammation of sacroiliac joint (CMS-HCC)    Sarcoidosis of lung (Multi)    Dyspnea on effort    Tachycardia    Trochanteric bursitis of left hip    Balance problems     Medical History:   Past Medical History:   Diagnosis Date    Asthma     Cataract     Chronic sphenoidal sinusitis     Colon polyp     COPD (chronic obstructive pulmonary  "disease) (Multi)     Coronary artery disease     Depression     Epistaxis     GERD (gastroesophageal reflux disease)     Hyperlipidemia     Hypertension     Lumbar disc disease     Migraine     Nasal lesion     Non-rheumatic mitral regurgitation     Nonrheumatic aortic (valve) insufficiency     Osteoporosis     Peripheral neuropathy     Pulmonary nodules     Pulmonary sarcoidosis (Multi)     in remission    Spinal stenosis     lumbar    Vision loss     right eye     Surgical Hx:   Past Surgical History:   Procedure Laterality Date    ACHILLES TENDON SURGERY Left     ACHILLES TENDON SURGERY Right     CATARACT EXTRACTION  2017    COLONOSCOPY  2021    CT ANGIO CORONARY ART WITH HEARTFLOW IF SCORE >30%  08/10/2017    CT HEART CORONARY ANGIOGRAM 8/10/2017 [facility]    CT ANGIO CORONARY ART WITH HEARTFLOW IF SCORE >30%  10/07/2022    CT HEART CORONARY ANGIOGRAM 10/7/2022 [facility]    EYE SURGERY Right     infection    FOOT SURGERY Right     HEMORRHOID SURGERY      LUMBAR LAMINECTOMY  03/2022    MOUTH SURGERY      Tooth Extraction    NASAL TURBINATE REDUCTION      RHINOPLASTY      SPINE SURGERY  10/2022    WRIST SURGERY Left 07/2021      Social Hx:   Tobacco Use: Low Risk  (1/16/2025)    Patient History     Smoking Tobacco Use: Never     Smokeless Tobacco Use: Never     Passive Exposure: Not on file     Alcohol Use: Not on file     Family Hx:   Family History   Problem Relation Name Age of Onset    Cancer Mother      Heart disease Mother      Alzheimer's disease Mother      Cancer Father      Heart disease Father      Dementia Father        Exam:   Vitals:   Vitals:    01/16/25 1151   BP: 118/68   BP Location: Right arm   Patient Position: Sitting   BP Cuff Size: Adult   Pulse: 62   Weight: 74.8 kg (165 lb)   Height: 1.753 m (5' 9\")     Wt Readings from Last 5 Encounters:   01/16/25 74.8 kg (165 lb)   12/30/24 78.4 kg (172 lb 12.8 oz)   10/23/24 78.9 kg (174 lb)   10/14/24 72.6 kg (160 lb)   09/26/24 79.8 " kg (175 lb 14.4 oz)      Constitutional:       Appearance: Healthy appearance. Not in distress.   Pulmonary:      Effort: Pulmonary effort is normal.      Breath sounds: Normal breath sounds.   Cardiovascular:      PMI at left midclavicular line. Normal rate. Regular rhythm. S1 with normal intensity. S2 with normal intensity.       Murmurs: There is no murmur.   Pulses:     Intact distal pulses.   Edema:     Peripheral edema absent.   Neurological:      Mental Status: Alert and oriented to person, place, and time.       Labs:   Recent Labs     04/17/24  1018 10/17/22  0647 10/16/22  0822 10/15/22  1000 10/07/22  1336 06/09/22  1033 06/14/21  1141 09/01/20  0925   WBC 9.5 15.8* 19.2* 12.8* 8.0 7.4 6.2 7.1   HGB 14.6 14.7 15.6 12.5 14.1 13.6 14.0 14.8   HCT 47.3* 45.3 48.3* 39.9 44.6 44.2 43.8 47.5*    234 252 182 225 209 205 206   MCV 93 91 91 92 92 92 91 92     Recent Labs     04/18/24  1141 04/17/24  1018 07/03/23  1155 10/20/22  1015 10/19/22  1050 10/17/22  0647 10/16/22  0822 10/15/22  1000    142 141 139 143 140 144 142   K 4.3 5.6* 4.2 2.9* 3.2* 3.4* 3.4* 4.2    103 104 100 102 102 107 107   BUN 25* 27* 17 12 16 16 18 14   CREATININE 0.92 0.96 0.91 0.57 0.55 0.57 0.72 0.81      Recent Labs     09/01/20  0925   BNP 66     Lab Results   Component Value Date    CHOL 154 07/08/2024    HDL 50.6 07/08/2024    LDLF 65 07/03/2023    TRIG 182 (H) 07/08/2024     Lab Results   Component Value Date    LDLCALC 67 07/08/2024      Notable Studies: imaging personally reviewed and summarized by me below  EKG:  Encounter Date: 09/26/24   ECG 12 lead (Clinic Performed)   Result Value    Ventricular Rate 64    Atrial Rate 64    OH Interval 188    QRS Duration 98    QT Interval 382    QTC Calculation(Bazett) 394    P Axis 71    R Axis -47    T Axis 86    QRS Count 11    Q Onset 211    P Onset 117    P Offset 178    T Offset 402    QTC Fredericia 390    Narrative    Normal sinus rhythm  Left axis  deviation  Moderate voltage criteria for LVH, may be normal variant  Nonspecific ST and T wave abnormality  Abnormal ECG  When compared with ECG of 28-MAR-2024 08:16,  Nonspecific T wave abnormality, worse in Lateral leads  Confirmed by Darrell Reyes ([id number]) on 10/7/2024 7:57:12 AM       Echo:  - Echo (9/10/2020)  PHYSICIAN INTERPRETATION:  Left Ventricle: The left ventricular systolic function is normal, with an estimated ejection fraction of 60-65%. There are no regional wall motion abnormalities. The left ventricular cavity size is normal. There is moderate eccentric left ventricular hypertrophy. Spectral Doppler shows a pseudonormal pattern of left ventricular diastolic filling.  Left Atrium: The left atrium is moderately dilated.  Right Ventricle: The right ventricle is normal in size. There is low normal right ventricular systolic function.  Right Atrium: The right atrium is normal in size.  Aortic Valve: The aortic valve is trileaflet. There is no evidence of aortic valve regurgitation. The peak instantaneous gradient of the aortic valve is 13.5 mmHg.  Mitral Valve: The mitral valve is normal in structure. There is mild mitral annular calcification. There is trace mitral valve regurgitation.  Tricuspid Valve: The tricuspid valve is structurally normal. There is trace tricuspid regurgitation. The Doppler estimated RVSP is slightly elevated at 33.5 mmHg.  Pulmonic Valve: The pulmonic valve is not well visualized. The pulmonic valve regurgitation was not well visualized.  Pericardium: There is no pericardial effusion noted.  Aorta: The aortic root is normal.  Systemic Veins: The inferior vena cava appears to be of normal size. There is IVC inspiratory collapse greater than 50%.  In comparison to the previous echocardiogram(s): Compared with study from 7/21/2015,. Compared with the prior exam from 7/21/2015 there is still preserved LV systolic funciton, however the LA size has greatly increased and there is now  pseudonormal diastolic function. The RVSP is similar. Previously was slightly increased at 31mmHg.        CONCLUSIONS:   1. The left ventricular systolic function is normal with a 60-65% estimated ejection fraction.   2. Poorly visualized anatomical structures due to suboptimal image quality.   3. Spectral Doppler shows a pseudonormal pattern of left ventricular diastolic filling.   4. There is moderate eccentric left ventricular hypertrophy.   5. The left atrium is moderately dilated.   6. Slightly elevated RVSP.   7. Compared with the prior exam from 7/21/2015 there is still preserved LV systolic funciton, however the LA size has greatly increased and there is now pseudonormal diastolic function. The RVSP is similar. Previously was slightly increased at 31mmHg.        Catheterization:  - [facility] (9/15/2020)  CONCLUSIONS:   1. Non-obstructive CAD (40-50% ostial LAD) in a codominant system.   2. Tortuous coronary arteries.   3. Elevated LVEDP of 25 mmHg.        CPET 9/30/2020  Impression:   1. Abnormal cardiopulmonary exercise test.   2. The test was submaximal limiting interpretation. RER did not exceed 1, HR max was only 63% predicted. No ventilatory threshold was achieved.   3. The patient was hypertensive at rest 154/78.   4. The HR response to exercise seemed blunted, 66->92. Significant exercise motion artifact limits interpretation of exercise ECG. No ischemic changes were present during recovery.   5. There was no evidence of respiratory limitation during this limited exam. Breathing reserve was 56%, no significant desaturation was seen. There was a normal drop of dead space with exercise.   6. Overall interpretation is that of deconditioning. Cardiac limitation may be present as Chronotropic response seemed blunted and the patient was hypertensive at rest.        cCTA with heartflow 10/7/2022:   IMPRESSION:  1. No significant left main coronary disease.  2. Mixed atherosclerosis involving the proximal LAD  resulting in  50-75% luminal stenosis. Study was sent to heart flow for evaluation  of FFR CT. Report will be addended once results are available.  3. Mild calcified atherosclerosis involving the proximal LCX  resulting in less than 25% luminal stenosis.  4. Partially visualized nodularity in the right upper lobe which was  previously seen on chest CT 08/24/2029. Recommend continued follow-up  with chest CT for full characterization.  eart flow results:  First Diagonal branch FFRCT 0.74 proximally, suggestive of  hemodynamically-significant lesion  Remainder of the coronary arteries FFRCT>0.8, reflecting  non-hemodynamically lesion           Current Outpatient Medications   Medication Instructions    acetaminophen (TYLENOL 8 HOUR) 650 mg, Every 8 hours PRN    amLODIPine (NORVASC) 5 mg, oral, Daily    aspirin (ADULT LOW DOSE ASPIRIN) 81 mg    atorvastatin (LIPITOR) 40 mg, oral, Daily    budesonide-formoteroL (Symbicort) 160-4.5 mcg/actuation inhaler 2 puffs, inhalation, 2 times daily RT, Rinse mouth with water after use to reduce aftertaste and incidence of candidiasis. Do not swallow.    calcium carbonate-vitamin D3 500 mg-5 mcg (200 unit) tablet 1 tablet, Daily    cloNIDine (CATAPRES) 0.1 mg, oral, Daily    escitalopram (LEXAPRO) 20 mg, Daily RT    ezetimibe (ZETIA) 10 mg, oral, Nightly    gabapentin (NEURONTIN) 300 mg, Nightly    lamoTRIgine (LAMICTAL) 100 mg, 2 times daily    losartan (COZAAR) 100 mg, oral, Daily    montelukast (SINGULAIR) 10 mg, oral, Daily, as directed    traZODone (Desyrel) 100 mg tablet     triamterene-hydrochlorothiazid (Dyazide) 37.5-25 mg capsule 1 capsule, oral, Every morning    trospium (Sanctura) 20 mg tablet 1/2 to 1 twice a day as needed for urinary symptoms     Impressions and Plan:    Patient is a 79 year old woman with sarcoidosis ( lung ) with prior history of moderate proximal LAD lesion ( CTA with herat flow in 2017) with HTN,and hyperlipidemia who has been medically managed  since 2017. Previously  noted  progressive ROBELRO  with CP on exertion starting in summer 2020. Cardiac cath in September 2020 showed moderate LAD disease ( no significant stenoses) and CPET eventually showed deconditioning as well as blunted HR response. Prior to back surgery had CTA with heart flow 10/2022 showing nonsignifiant stenoses except for in D1 which was a small vessel( 3mm) that would not warrant cardiac cath. Pursuing medical therapy at this time. She is tolerating her atorvastatin well without myalgias and a good response with LDL at target. Her BP is now well controlled following addition of triamterene/hydrochlorothiazide. Currently denies CP or SOB.  Plan  1. CAD- atorvastatin, zetia losartan and aspirin.  2. hyperlipidemia- continue atorvastatin 40 mg daily and zetia. LDL target <70 . At target July 2024  3. HTN- BP at home now adequately controlled. To continue clonidine .1mg, losartan 100 mg, and amlodipine 5 mg daily and triamterene/hydrochlorothiazide 37.5/25. BP well controlled at home and at office. BMP today to assess renal fx and K+ after stating diuretic.   4. Dyspnea- resolved  5. sarcoid- rx of lung sarcoid as per Dr Gonzales.  6. Continue current level of exercise   return to clinic in 2 months.  Patient Instructions:  If you have any questions or need cardiac medication refills, please call my office at [phone],      To reach my office please call [phone]  To schedule an appointment call [phone].          ____________________________________________________________  Rea Ramirez MD  Division of Cardiovascular Medicine  [facility]  [facility]

## 2025-01-16 NOTE — PATIENT INSTRUCTIONS
1. Coronary artery disease. Continue atorvastatin 40 mg and zetia (LDL at target 7/2024) and aspirin.  2. Dyspnea- Sarcoid of lung as per Dr Gonzales. Improved with inhaler and nebulizer. Currently no shortness of breath on exertion.  3. Hypertension- Continue the  losartan 100 mg, clonidine 0.1 mg daily, triamterene/hydrochlorothiazide 37.5/25 mg and amlodipine 5 mg daily. Check home BP and HR and log results for review at next visit. BP currently well controlled. Get BMP ( blood test ) today.  4. Hyperlipidemia- continue atorvastatin 40 mg daily and zetia 10 mg daily. Your LDL target is < 70.   5. Continue current level of exercise and continue working on exercises to improve leg strength.   Return to clinic in 3 months

## 2025-01-21 ENCOUNTER — APPOINTMENT (OUTPATIENT)
Dept: PRIMARY CARE | Facility: CLINIC | Age: 80
End: 2025-01-21
Payer: MEDICARE

## 2025-01-22 ENCOUNTER — TELEPHONE (OUTPATIENT)
Dept: OTOLARYNGOLOGY | Facility: HOSPITAL | Age: 80
End: 2025-01-22
Payer: MEDICARE

## 2025-01-29 ENCOUNTER — APPOINTMENT (OUTPATIENT)
Dept: OTOLARYNGOLOGY | Facility: CLINIC | Age: 80
End: 2025-01-29
Payer: MEDICARE

## 2025-01-31 DIAGNOSIS — I25.10 CORONARY ARTERY DISEASE INVOLVING NATIVE CORONARY ARTERY OF NATIVE HEART WITHOUT ANGINA PECTORIS: ICD-10-CM

## 2025-02-03 RX ORDER — EZETIMIBE 10 MG/1
10 TABLET ORAL NIGHTLY
Qty: 90 TABLET | Refills: 0 | Status: SHIPPED | OUTPATIENT
Start: 2025-02-03

## 2025-02-05 ENCOUNTER — APPOINTMENT (OUTPATIENT)
Dept: OTOLARYNGOLOGY | Facility: CLINIC | Age: 80
End: 2025-02-05
Payer: MEDICARE

## 2025-02-05 VITALS — BODY MASS INDEX: 24.44 KG/M2 | WEIGHT: 165 LBS | HEIGHT: 69 IN

## 2025-02-05 DIAGNOSIS — J32.3 CHRONIC SPHENOIDAL SINUSITIS: Primary | ICD-10-CM

## 2025-02-05 DIAGNOSIS — H61.21 IMPACTED CERUMEN OF RIGHT EAR: ICD-10-CM

## 2025-02-05 DIAGNOSIS — R04.0 EPISTAXIS: ICD-10-CM

## 2025-02-05 PROCEDURE — 99213 OFFICE O/P EST LOW 20 MIN: CPT | Performed by: OTOLARYNGOLOGY

## 2025-02-05 PROCEDURE — 1159F MED LIST DOCD IN RCRD: CPT | Performed by: OTOLARYNGOLOGY

## 2025-02-05 PROCEDURE — 31231 NASAL ENDOSCOPY DX: CPT | Performed by: OTOLARYNGOLOGY

## 2025-02-05 PROCEDURE — 69210 REMOVE IMPACTED EAR WAX UNI: CPT | Performed by: OTOLARYNGOLOGY

## 2025-02-05 PROCEDURE — 1036F TOBACCO NON-USER: CPT | Performed by: OTOLARYNGOLOGY

## 2025-02-05 PROCEDURE — 1157F ADVNC CARE PLAN IN RCRD: CPT | Performed by: OTOLARYNGOLOGY

## 2025-02-06 LAB
ANION GAP SERPL CALCULATED.4IONS-SCNC: 11 MMOL/L (CALC) (ref 7–17)
BUN SERPL-MCNC: 27 MG/DL (ref 7–25)
BUN/CREAT SERPL: 28 (CALC) (ref 6–22)
CALCIUM SERPL-MCNC: 10.2 MG/DL (ref 8.6–10.4)
CHLORIDE SERPL-SCNC: 103 MMOL/L (ref 98–110)
CO2 SERPL-SCNC: 29 MMOL/L (ref 20–32)
CREAT SERPL-MCNC: 0.97 MG/DL (ref 0.6–1)
EGFRCR SERPLBLD CKD-EPI 2021: 60 ML/MIN/1.73M2
GLUCOSE SERPL-MCNC: 92 MG/DL (ref 65–99)
POTASSIUM SERPL-SCNC: 4.5 MMOL/L (ref 3.5–5.3)
SODIUM SERPL-SCNC: 143 MMOL/L (ref 135–146)

## 2025-02-06 NOTE — PROGRESS NOTES
"Referring Provider: No ref. provider found    History of Present Illness:    Luis Ann is a 79 y.o. female, presenting for post-operative follow up after endoscopic removal of left sphenoid mycetoma. No residual symptomology or complaints.    ?  Review of Systems:    Review of symptoms was negative except for those stated including Cardiopulmonary, Genitourinary, Gastrointestinal, Psychological, Sleep pattern, Endocrine, Eyes, Neurologic, Musculoskeletal, Skin, Hematologic/Lymphatic and Allergic/Immunologic.     Medical History:    I have reviewed the patient's updated past medical history, surgical history, family history, social history, as well as current medications and allergies as of 2/5/2025. Changes to these items have been updated and marked as reviewed in the electronic medical record.    Physical Exam:    Vitals:  height is 1.753 m (5' 9\") and weight is 74.8 kg (165 lb).   General: Patient doing well overall and is in no apparent distress.  Psych: Pleasant affect, and answers questions appropriately.  Head & Face: Symmetric facial movements  Eyes: Pupils equal, round, reactive.  Extraocular movements intact without gaze restrictions or nystagmus. No epiphora.  Ears:  External auditory canals are normal except right sided cerumen impaction which was removed under direct endoscopic visualization with rigid instrumentation.  Tympanic membranes are clear.  No middle ear effusion is seen.  All middle ear landmarks are normal.  Nose: Anterior rhinoscopy revealed normal sinonasal mucosa. More posterior areas of the nasal cavity could not be completely examined.  Oral Cavity/Oropharynx:  Without lesions or masses to visual exam.  Neck: Supple without lymphadenopathy.  Lungs: Non-labored, and without evidence of stridor.  Cardiac: Pulses are strong, well-perfused.  Extremities: Without gross evidence of clubbing, cyanosis, or edema.  Neuro: Cranial nerves II-XII grossly intact; Intact facial " movements.    Procedure:  Rigid nasal endoscopy (68202)  Pre-procedure diagnosis/Indication for procedure:  To evaluate areas not visualized on anterior rhinoscopy   Anesthesia:  None  Description:  A 0-degree 3-mm rigid nasal endoscope was used to examine the left and right nasal cavities.  The nasal valve areas were examined for abnormalities or collapse.  The inferior and middle turbinates were evaluated.  The middle and superior meatuses, and the sphenoethmoid recesses were examined and inspected for mucopurulence and polyps. Once the endoscope was withdrawn, the patient was noted to have tolerated the procedure well without complications and was returned to ambulatory status.    Findings:    Widely patent left sphenoid sinusotomy without mucopurulence or residual fungal debris.      Assessment:     Luis Ann is a 79 y.o. female with left sphenoid sinusitis/mycetoma s/p endoscopic sinus surgery for removal. Healed well. No further issues.    Plan:      She is doing great from a sinus perspective. Recommend she follow up as needed at this point. Follow up with general ENT for cerumen removal in the future.       Deejay Pierce MD, M.Eng.   of Otolaryngology - Head & Neck Surgery  Division of Rhinology and Endoscopic Skull Base Surgery  [facility]

## 2025-02-27 ENCOUNTER — APPOINTMENT (OUTPATIENT)
Dept: OBSTETRICS AND GYNECOLOGY | Facility: CLINIC | Age: 80
End: 2025-02-27
Payer: MEDICARE

## 2025-03-04 DIAGNOSIS — E78.5 HYPERLIPIDEMIA, UNSPECIFIED HYPERLIPIDEMIA TYPE: ICD-10-CM

## 2025-03-04 RX ORDER — ATORVASTATIN CALCIUM 40 MG/1
40 TABLET, FILM COATED ORAL DAILY
Qty: 90 TABLET | Refills: 3 | Status: SHIPPED | OUTPATIENT
Start: 2025-03-04 | End: 2026-02-27

## 2025-03-31 ENCOUNTER — TELEPHONE (OUTPATIENT)
Dept: PULMONOLOGY | Facility: CLINIC | Age: 80
End: 2025-03-31
Payer: MEDICARE

## 2025-03-31 DIAGNOSIS — J45.40 MODERATE PERSISTENT ASTHMA WITHOUT COMPLICATION (HHS-HCC): ICD-10-CM

## 2025-03-31 RX ORDER — MONTELUKAST SODIUM 10 MG/1
10 TABLET ORAL DAILY
Qty: 30 TABLET | Refills: 0 | Status: SHIPPED | OUTPATIENT
Start: 2025-03-31

## 2025-04-03 ENCOUNTER — DOCUMENTATION (OUTPATIENT)
Dept: PULMONOLOGY | Facility: HOSPITAL | Age: 80
End: 2025-04-03
Payer: MEDICARE

## 2025-04-03 ENCOUNTER — OFFICE VISIT (OUTPATIENT)
Dept: URGENT CARE | Age: 80
End: 2025-04-03
Payer: MEDICARE

## 2025-04-03 VITALS
RESPIRATION RATE: 18 BRPM | TEMPERATURE: 98.2 F | HEART RATE: 74 BPM | WEIGHT: 165 LBS | OXYGEN SATURATION: 95 % | BODY MASS INDEX: 24.44 KG/M2 | DIASTOLIC BLOOD PRESSURE: 76 MMHG | SYSTOLIC BLOOD PRESSURE: 125 MMHG | HEIGHT: 69 IN

## 2025-04-03 DIAGNOSIS — J02.9 SORE THROAT: ICD-10-CM

## 2025-04-03 DIAGNOSIS — J98.8 RESPIRATORY TRACT INFECTION: Primary | ICD-10-CM

## 2025-04-03 DIAGNOSIS — J02.9 PHARYNGITIS, UNSPECIFIED ETIOLOGY: Primary | ICD-10-CM

## 2025-04-03 LAB
POC HUMAN RHINOVIRUS PCR: NEGATIVE
POC INFLUENZA A VIRUS PCR: NEGATIVE
POC INFLUENZA B VIRUS PCR: NEGATIVE
POC RESPIRATORY SYNCYTIAL VIRUS PCR: NEGATIVE
POC STREPTOCOCCUS PYOGENES (GROUP A STREP) PCR: NEGATIVE

## 2025-04-03 PROCEDURE — 1157F ADVNC CARE PLAN IN RCRD: CPT | Performed by: PHYSICIAN ASSISTANT

## 2025-04-03 PROCEDURE — 1036F TOBACCO NON-USER: CPT | Performed by: PHYSICIAN ASSISTANT

## 2025-04-03 PROCEDURE — 3074F SYST BP LT 130 MM HG: CPT | Performed by: PHYSICIAN ASSISTANT

## 2025-04-03 PROCEDURE — 3078F DIAST BP <80 MM HG: CPT | Performed by: PHYSICIAN ASSISTANT

## 2025-04-03 PROCEDURE — 1159F MED LIST DOCD IN RCRD: CPT | Performed by: PHYSICIAN ASSISTANT

## 2025-04-03 PROCEDURE — 87631 RESP VIRUS 3-5 TARGETS: CPT | Performed by: PHYSICIAN ASSISTANT

## 2025-04-03 PROCEDURE — 99213 OFFICE O/P EST LOW 20 MIN: CPT | Performed by: PHYSICIAN ASSISTANT

## 2025-04-03 PROCEDURE — 87651 STREP A DNA AMP PROBE: CPT | Performed by: PHYSICIAN ASSISTANT

## 2025-04-03 ASSESSMENT — ENCOUNTER SYMPTOMS: SORE THROAT: 1

## 2025-04-03 NOTE — PROGRESS NOTES
Patient has a severe sore throat that has been going on for 3 to 4 days.  I told her to go to urgent care to get a culture.

## 2025-04-03 NOTE — PROGRESS NOTES
Subjective   Patient ID: Luis Ann is a 79 y.o. female. They present today with a chief complaint of Sore Throat (Pt has sore throat 3 days ).    History of Present Illness    Sore Throat       This is a 79-year-old female presents to urgent care for sore throat.  Symptoms started 3 days ago.  Endorses sore throat.  She feels like the lymph nodes in her neck are swollen.  She has tried Tylenol and Robitussin.  Denies drooling.  Does have a slight nonproductive cough.  Endorses fatigue.  She does have a friend who is sick.  Denies fevers, headache, chest pain, shortness of breath, nasal congestion, vomiting, diarrhea, abdominal pain.  Past Medical History  Allergies as of 04/03/2025 - Reviewed 04/03/2025   Allergen Reaction Noted    Penicillins GI Upset and Other 02/08/2024       (Not in a hospital admission)       Past Medical History:   Diagnosis Date    Asthma     Cataract     Chronic sphenoidal sinusitis     Colon polyp     COPD (chronic obstructive pulmonary disease) (Multi)     Coronary artery disease     Depression     Epistaxis     GERD (gastroesophageal reflux disease)     Hyperlipidemia     Hypertension     Lumbar disc disease     Migraine     Nasal lesion     Non-rheumatic mitral regurgitation     Nonrheumatic aortic (valve) insufficiency     Osteoporosis     Peripheral neuropathy     Pulmonary nodules     Pulmonary sarcoidosis (Multi)     in remission    Spinal stenosis     lumbar    Vision loss     right eye       Past Surgical History:   Procedure Laterality Date    ACHILLES TENDON SURGERY Left     ACHILLES TENDON SURGERY Right     CATARACT EXTRACTION  2017    COLONOSCOPY  2021    CT ANGIO CORONARY ART WITH HEARTFLOW IF SCORE >30%  08/10/2017    CT HEART CORONARY ANGIOGRAM 8/10/2017 [facility]    CT ANGIO CORONARY ART WITH HEARTFLOW IF SCORE >30%  10/07/2022    CT HEART CORONARY ANGIOGRAM 10/7/2022 [facility]    EYE SURGERY Right     infection    FOOT SURGERY Right     HEMORRHOID  "SURGERY      LUMBAR LAMINECTOMY  03/2022    MOUTH SURGERY      Tooth Extraction    NASAL TURBINATE REDUCTION      RHINOPLASTY      SPINE SURGERY  10/2022    WRIST SURGERY Left 07/2021        reports that she has never smoked. She has never used smokeless tobacco. She reports that she does not currently use alcohol. She reports that she does not currently use drugs.    Review of Systems  Review of Systems   HENT:  Positive for sore throat.    All other systems reviewed and are negative.                                 Objective    Vitals:    04/03/25 1231   BP: 125/76   BP Location: Left arm   Patient Position: Sitting   BP Cuff Size: Adult   Pulse: 74   Resp: 18   Temp: 36.8 °C (98.2 °F)   TempSrc: Oral   SpO2: 95%   Weight: 74.8 kg (165 lb)   Height: 1.753 m (5' 9\")     No LMP recorded. Patient is postmenopausal.    Physical Exam  Physical Exam:    General: Vitals noted no distress. Afebrile. Normal phonation, no stridor, no trismus.    ENT: Left TM is unremarkable. Right TM is unremarkable. EACs unremarkable. Mastoids nontender. Posterior oropharynx is erythematous without edema or exudate.  There is no petechiae on the palate uvula is in the midline and nonedematous. No peritonsillar abscess. No Vince's Angina.    Neck: Supple. No meningismus through full range of motion.  anterior cervical chain lymphadenopathy. No posterior cervical chain lymphadenopathy    Cardiac: Regular rate rhythm    Lungs: Good aeration throughout. No adventitious breath sounds.    Abdomen: Soft, nontender, nonsurgical throughout. There is no splenomegaly.    Extremities: No peripheral edema    Skin: No rash    Neuro: No gross neurological deficits  Procedures    Point of Care Test & Imaging Results from this visit  No results found for this visit on 04/03/25.   Imaging  No results found.    Cardiology, Vascular, and Other Imaging  No other imaging results found for the past 2 days      Diagnostic study results (if any) were reviewed by " Milan Diaz PA-C.    Assessment/Plan   Allergies, medications, history, and pertinent labs/EKGs/Imaging reviewed by Milan Diaz PA-C.     Medical Decision Making  Summary: Patient is here for a sore throat. Vital signs were reviewed. Patient is well-appearing nontoxic on the exam. Differential diagnosis includes not limited to strep pharyngitis, viral pharyngitis, peritonsillar abscess, retropharyngeal abscess, and Vince's angina. There is no change in voice. Patient is tolerating liquids and secretions. There is no drooling. My concern for peritonsillar abscess, retropharyngeal abscess, and Vince's angina is low. Spotfire strep is negative. Likely viral pharyngitis. Supportive care discussed. Tylenol for pain as needed.   Stable for discharge. Follow-up with PCP. Return to urgent care or go to the emergency department if symptoms worsen or if new symptoms develop.    Orders and Diagnoses  Diagnoses and all orders for this visit:  Sore throat  -     POCT SPOTFIRE R/ST Panel Mini w/Strep A ([facility]) manually resulted      Medical Admin Record      Patient disposition: Home    Electronically signed by Milan Diaz PA-C  12:44 PM

## 2025-04-03 NOTE — TELEPHONE ENCOUNTER
Patient sts she felt like she was getting a cold on Sunday and now has a sore throat and a dry cough.    Please call to advise

## 2025-04-03 NOTE — PATIENT INSTRUCTIONS
"Sore throat in adults    The Basics  Written by the doctors and editors at [facility]  What causes sore throat?  Sore throat is usually caused by an infection. Two types of germs can cause it: viruses and bacteria.  People who have a sore throat caused by a virus do not usually need to see a doctor or nurse. But if you think that you might have been exposed to COVID-19, or if COVID-19 is common where you live, ask your doctor or nurse if you should be tested.  People who have a sore throat caused by bacteria might need to see a doctor or nurse. They might have a type of infection called strep throat. Only about 1 in 10 adults who seek medical care for sore throat have strep throat.  Is my sore throat is caused by a virus or strep throat?  It is hard to tell the difference. But there are some clues to look for.  People who have a sore throat caused by a virus usually have other symptoms, such as:  ? Stuffy or runny nose  ? Itchy or red eyes  ? Cough  People who have COVID-19 can have a sore throat as their only symptom. Or they can have other symptoms such as fever, cough, trouble breathing, and problems with their sense of smell or taste. In most cases, the only way to know for sure if you have COVID-19 is to get tested.  People who have strep throat do not usually have a cough, runny nose, or itchy or red eyes. They might have:  ? Severe throat pain  ? Fever (temperature higher than 100.4°F, or 38°C)  ? Swollen glands in the neck  If you think that you have strep throat, the doctor or nurse can easily check. They can take a sample from the back of your throat and test it for the bacteria that cause strep throat.  Do I need antibiotics?  If you have an infection caused by a virus, you do not need antibiotics. Some people with COVID-19 benefit from \"antiviral\" medicine.  If you have strep throat, you should get antibiotics. Most people with strep throat get better without antibiotics, but doctors and nurses often " prescribe them. This is because antibiotics often can prevent other problems that might be caused by strep throat. Plus, they can reduce the symptoms of strep throat and prevent you from spreading it to other people.  What can I do to feel better?  To relieve the pain of sore throat, you can:  ? Take over-the-counter pain medicine - Acetaminophen (sample brand name: Tylenol) or ibuprofen (sample brand names: Advil, Motrin) can help with throat pain.  ? Use medicated sore throat lozenges or sprays - These can temporarily reduce throat pain.  ? Suck on hard candies, ice chips, or ice pops.  ? Gargle with salt water - This can sometimes help with throat pain.  ? Use a cool mist humidifier - This adds moisture to the air. Some people find that this helps.  ? Avoid smoking or being around people who are smoking - Smoke can make throat pain worse.  When can I go back to work or school?  If you have strep throat, wait 1 day after starting antibiotics. By then, you will be a lot less likely to spread the infection to others.  If you have COVID-19, stay home from work or school while you are sick. Do not go to work or school until your fever has been gone for at least 24 hours without taking medicine such as acetaminophen.  If you have a sore throat that is not due to strep throat or COVID-19, you can go back to your usual activities as soon as you feel well. But it's still important to wash your hands often and cover your mouth if you cough.  When should I call the doctor?  Call your doctor or nurse if:  ? You have a fever of at least 101°F (38.4°C).  ? Your throat pain is severe within the first 2 days, or does not start to improve within 5 to 7 days.  ? You are having trouble getting enough to eat or drink.  ? You got antibiotics but still have symptoms after finishing them.  Call for an ambulance (in the [place] and [place], call 9-1-1) or go to the emergency department if you:  ? Have trouble breathing  ? Are drooling because  you cannot swallow your saliva  ? Have swelling of your neck or tongue  ? Cannot move your neck, or have trouble opening your mouth  How can I prevent getting a sore throat again?  Wash your hands often with soap and water (figure 1). It is one of the best ways to prevent the spread of infection.  All topics are updated as new evidence becomes available and our peer review process is complete.

## 2025-04-08 ENCOUNTER — OFFICE VISIT (OUTPATIENT)
Dept: URGENT CARE | Age: 80
End: 2025-04-08
Payer: MEDICARE

## 2025-04-08 ENCOUNTER — ANCILLARY PROCEDURE (OUTPATIENT)
Dept: URGENT CARE | Age: 80
End: 2025-04-08
Payer: MEDICARE

## 2025-04-08 VITALS
WEIGHT: 165 LBS | DIASTOLIC BLOOD PRESSURE: 65 MMHG | HEIGHT: 69 IN | TEMPERATURE: 99 F | RESPIRATION RATE: 20 BRPM | SYSTOLIC BLOOD PRESSURE: 137 MMHG | HEART RATE: 75 BPM | OXYGEN SATURATION: 96 % | BODY MASS INDEX: 24.44 KG/M2

## 2025-04-08 DIAGNOSIS — J01.90 ACUTE BACTERIAL SINUSITIS: Primary | ICD-10-CM

## 2025-04-08 DIAGNOSIS — H66.91 RIGHT OTITIS MEDIA, UNSPECIFIED OTITIS MEDIA TYPE: ICD-10-CM

## 2025-04-08 DIAGNOSIS — B96.89 ACUTE BACTERIAL SINUSITIS: Primary | ICD-10-CM

## 2025-04-08 DIAGNOSIS — J02.9 SORE THROAT: ICD-10-CM

## 2025-04-08 DIAGNOSIS — R05.1 ACUTE COUGH: ICD-10-CM

## 2025-04-08 PROCEDURE — 71046 X-RAY EXAM CHEST 2 VIEWS: CPT | Performed by: STUDENT IN AN ORGANIZED HEALTH CARE EDUCATION/TRAINING PROGRAM

## 2025-04-08 RX ORDER — AMOXICILLIN AND CLAVULANATE POTASSIUM 875; 125 MG/1; MG/1
1 TABLET, FILM COATED ORAL 2 TIMES DAILY
Qty: 14 TABLET | Refills: 0 | Status: SHIPPED | OUTPATIENT
Start: 2025-04-08 | End: 2025-04-15

## 2025-04-08 RX ORDER — BENZONATATE 200 MG/1
200 CAPSULE ORAL 3 TIMES DAILY PRN
Qty: 30 CAPSULE | Refills: 0 | Status: SHIPPED | OUTPATIENT
Start: 2025-04-08 | End: 2025-04-18

## 2025-04-08 ASSESSMENT — ENCOUNTER SYMPTOMS
OCCASIONAL FEELINGS OF UNSTEADINESS: 0
LOSS OF SENSATION IN FEET: 0
COUGH: 1
SORE THROAT: 1
DEPRESSION: 0

## 2025-04-08 NOTE — PROGRESS NOTES
Subjective   Patient ID: Luis Ann is a 79 y.o. female. They present today with a chief complaint of Cough (Almost 2 weeks.) and Sore Throat.    History of Present Illness  Pt presents with illness x 2 weeks. Was seen here last week for same, tested negative for strep flu rsv rhinovirus. Dx with viral illness and advised supportive care. States still not feeling better. Sx include sore throat, feels glands are swollen in neck, R ear pressure and pain, dry cough, PND. She states she was exposed to pneumonia by her friend about 1 week ago. She is taking tylenol for sx without relief. Denies fever chills cp sob wheezing abd pain nvd leg swelling trouble swallowing HA dizziness. Pt reports pmhx of sarcoidosis of lungs in remission, HTN, CAD, depression, COPD, GERD.       History provided by:  Patient  Cough  Associated symptoms include ear pain, postnasal drip and a sore throat.   Sore Throat   Associated symptoms include coughing and ear pain.       Past Medical History  Allergies as of 04/08/2025    (No Known Allergies)       (Not in a hospital admission)       Past Medical History:   Diagnosis Date    Asthma     Cataract     Chronic sphenoidal sinusitis     Colon polyp     COPD (chronic obstructive pulmonary disease) (Multi)     Coronary artery disease     Depression     Epistaxis     GERD (gastroesophageal reflux disease)     Hyperlipidemia     Hypertension     Lumbar disc disease     Migraine     Nasal lesion     Non-rheumatic mitral regurgitation     Nonrheumatic aortic (valve) insufficiency     Osteoporosis     Peripheral neuropathy     Pulmonary nodules     Pulmonary sarcoidosis (Multi)     in remission    Spinal stenosis     lumbar    Vision loss     right eye       Past Surgical History:   Procedure Laterality Date    ACHILLES TENDON SURGERY Left     ACHILLES TENDON SURGERY Right     CATARACT EXTRACTION  2017    COLONOSCOPY  2021    CT ANGIO CORONARY ART WITH HEARTFLOW IF SCORE >30%  08/10/2017    CT HEART  "CORONARY ANGIOGRAM 8/10/2017 [facility]    CT ANGIO CORONARY ART WITH HEARTFLOW IF SCORE >30%  10/07/2022    CT HEART CORONARY ANGIOGRAM 10/7/2022 [facility] ANCILLARY LEGACY    EYE SURGERY Right     infection    FOOT SURGERY Right     HEMORRHOID SURGERY      LUMBAR LAMINECTOMY  03/2022    MOUTH SURGERY      Tooth Extraction    NASAL TURBINATE REDUCTION      RHINOPLASTY      SPINE SURGERY  10/2022    WRIST SURGERY Left 07/2021        reports that she has never smoked. She has never used smokeless tobacco. She reports that she does not currently use alcohol. She reports that she does not currently use drugs.    Review of Systems  Review of Systems   HENT:  Positive for ear pain, postnasal drip and sore throat.    Respiratory:  Positive for cough.                                   Objective    Vitals:    04/08/25 1208   BP: 137/65   BP Location: Left arm   Patient Position: Sitting   BP Cuff Size: Adult   Pulse: 75   Resp: 20   Temp: 37.2 °C (99 °F)   TempSrc: Oral   SpO2: 96%   Weight: 74.8 kg (165 lb)   Height: 1.753 m (5' 9\")     No LMP recorded. Patient is postmenopausal.    Physical Exam  Vitals and nursing note reviewed.   Constitutional:       General: She is not in acute distress.     Appearance: Normal appearance. She is not ill-appearing, toxic-appearing or diaphoretic.   HENT:      Head: Normocephalic and atraumatic.      Right Ear: Ear canal and external ear normal. Tenderness present. No swelling. There is no impacted cerumen. No foreign body. No mastoid tenderness. Tympanic membrane is erythematous and bulging. Tympanic membrane is not injected, perforated or retracted.      Left Ear: Tympanic membrane, ear canal and external ear normal.      Nose: Congestion present.      Right Nostril: No foreign body, epistaxis, septal hematoma or occlusion.      Right Turbinates: Not enlarged, swollen or pale.      Left Turbinates: Not enlarged, swollen or pale.      Right Sinus: No maxillary sinus tenderness or frontal " sinus tenderness.      Left Sinus: No maxillary sinus tenderness or frontal sinus tenderness.      Mouth/Throat:      Lips: Pink.      Mouth: Mucous membranes are moist.      Dentition: Normal dentition.      Pharynx: Uvula midline. Posterior oropharyngeal erythema and postnasal drip present. No pharyngeal swelling, oropharyngeal exudate or uvula swelling.      Tonsils: No tonsillar exudate or tonsillar abscesses.      Comments: +mild b/l submandibular lymphadenopathy  No other lymphadenopathy appreciated     No uvular edema or deviation. No tonsillar edema, asymmetry, exudates or evidence of PTA. No trismus drooling or stridor. Speaking in full and clear sentences. Airway is patent. Tolerating oral secretions. No dental abnormality. No neck swelling. No lesions or petechiae. No sublingual or submandibular induration edema or tenderness.   Cardiovascular:      Rate and Rhythm: Normal rate and regular rhythm.      Pulses: Normal pulses.      Heart sounds: No murmur heard.  Pulmonary:      Effort: Pulmonary effort is normal. No respiratory distress.      Breath sounds: No wheezing, rhonchi or rales.   Skin:     Capillary Refill: Capillary refill takes less than 2 seconds.      Findings: No rash.   Neurological:      General: No focal deficit present.      Mental Status: She is alert.         Procedures    Point of Care Test & Imaging Results from this visit  Results for orders placed or performed in visit on 04/08/25   POCT SPOTFIRE R/ST Panel Mini w/Strep A ([facility]) manually resulted   Result Value Ref Range    POC Group A Strep, PCR Negative Negative    POC Respiratory Syncytial Virus PCR Negative Negative    POC Influenza A Virus PCR Negative Negative    POC Influenza B Virus PCR Negative Negative    POC Human Rhinovirus PCR Negative Negative      Imaging  XR chest 2 views    Result Date: 4/8/2025  No evidence of acute intrathoracic abnormality.   Signed by: Skip Johnson 4/8/2025 12:57 PM Dictation workstation:    [id number]     Cardiology, Vascular, and Other Imaging  No other imaging results found for the past 2 days      Diagnostic study results (if any) were reviewed by Carlee Aldrich PA-C.    Assessment/Plan   Allergies, medications, history, and pertinent labs/EKGs/Imaging reviewed by Carlee Aldrich PA-C.           Orders and Diagnoses  Diagnoses and all orders for this visit:  Acute bacterial sinusitis  -     amoxicillin-pot clavulanate (Augmentin) 875-125 mg tablet; Take 1 tablet by mouth 2 times a day for 7 days.  Right otitis media, unspecified otitis media type  -     amoxicillin-pot clavulanate (Augmentin) 875-125 mg tablet; Take 1 tablet by mouth 2 times a day for 7 days.  Acute cough  -     XR chest 2 views; Future  -     benzonatate (Tessalon) 200 mg capsule; Take 1 capsule (200 mg) by mouth 3 times a day as needed for cough for up to 10 days. Do not crush or chew.  Sore throat  -     POCT SPOTFIRE R/ST Panel Mini w/Strep A ([facility]) manually resulted      Medical Admin Record      Patient disposition: Home    Electronically signed by Carlee Aldrich PA-C  1:11 PM

## 2025-04-14 ENCOUNTER — OFFICE VISIT (OUTPATIENT)
Dept: CARDIOLOGY | Facility: HOSPITAL | Age: 80
End: 2025-04-14
Payer: MEDICARE

## 2025-04-14 VITALS
BODY MASS INDEX: 24.37 KG/M2 | SYSTOLIC BLOOD PRESSURE: 107 MMHG | DIASTOLIC BLOOD PRESSURE: 64 MMHG | HEIGHT: 69 IN | OXYGEN SATURATION: 92 % | HEART RATE: 73 BPM

## 2025-04-14 DIAGNOSIS — I25.10 CORONARY ARTERY DISEASE INVOLVING NATIVE CORONARY ARTERY OF NATIVE HEART WITHOUT ANGINA PECTORIS: Primary | ICD-10-CM

## 2025-04-14 DIAGNOSIS — E78.5 DYSLIPIDEMIA, GOAL LDL BELOW 70: ICD-10-CM

## 2025-04-14 DIAGNOSIS — I10 PRIMARY HYPERTENSION: ICD-10-CM

## 2025-04-14 PROCEDURE — 3074F SYST BP LT 130 MM HG: CPT | Performed by: INTERNAL MEDICINE

## 2025-04-14 PROCEDURE — 3078F DIAST BP <80 MM HG: CPT | Performed by: INTERNAL MEDICINE

## 2025-04-14 PROCEDURE — 1160F RVW MEDS BY RX/DR IN RCRD: CPT | Performed by: INTERNAL MEDICINE

## 2025-04-14 PROCEDURE — 93005 ELECTROCARDIOGRAM TRACING: CPT | Performed by: INTERNAL MEDICINE

## 2025-04-14 PROCEDURE — 99214 OFFICE O/P EST MOD 30 MIN: CPT | Performed by: INTERNAL MEDICINE

## 2025-04-14 PROCEDURE — G2211 COMPLEX E/M VISIT ADD ON: HCPCS | Performed by: INTERNAL MEDICINE

## 2025-04-14 PROCEDURE — 1159F MED LIST DOCD IN RCRD: CPT | Performed by: INTERNAL MEDICINE

## 2025-04-14 PROCEDURE — 1157F ADVNC CARE PLAN IN RCRD: CPT | Performed by: INTERNAL MEDICINE

## 2025-04-14 NOTE — PROGRESS NOTES
Primary Care Physician: Royer Gonzales MD MPH      Date of Visit: 04/14/2025 10:20 AM EDT  Location of visit: [facility]   Type of Visit: Established Patient     HPI / Summary:   Patient is a 79 year old woman with HTN, hyperlipidemia and known sarcoidosis ( lung involvement) with moderate airflow obstruction and mild restriction with moderate LAD stenosis by CTA with heart flow in 2017, later found to have significant stenosis of a small D1 by cCTA in 2022  to have medical therapy  who returns for follow up      CAD risk factors: + HTN, hyperlipidemia, no DM, no FHx CAD, never smoked  prior cardiac work up ( Reggie hicks 2017) :  + Stress EKG for SOB.   Aug 10 2017: dense calcified plaque in the proximal LAD with 50-70% stenosis. FFR was negative ( lowest value 0.82) Other vessels without significant stenoses.   Decision to pursue medical management.   Patient with h/o lung sarcoid- had significant shortness of breath with the sarcoid, however feels that after having the sarcoid treated her breathing was markedly improved until the summer of 2020 when she noted progressive ROBLERO. She also some SOB at rest. She denied orthopnea PND or LE edema. She got SOB with minimal exertion and fell on her left side. SInce then she had constant left sided chest pain, but later noted exertional chest pressure. Noted increased HR when exerting herself. Noted palpitations on exertion and also with anxiety.      Work up included cardiac MRI 10/6/2020: mildly dilated LV with normal LV systolic function with LVEF 45  %, no fibrosis infiltration or scarring. Moderately dilated LA. Specifically no evidence for cardiac sarcoid  Cardiopulmonary stress test (CPET) Sept 30,2020: patient was hypertensive, blunted HR response ( from 66 to 92 bpm, no evidence of pulmonary limitation overall consistent with deconditioning.   Cardiac Monitor ( 2 WEEK) October 2020: HR 52 to 90 bpm avg 64bpm, 144 VEs SVEs 15,   Cardiac cath ( [facility])  9/15/2020: LAD prox 40-50%, Mid 20-30%, LM < 30%, RCA normal, LCx < 30%.   Renal artery ultrasound 9/23/2020: no renal artery stenosis     Prior to back surgery had CTA with heart flow October 10 2022: prox LAD 50-75% D2 25-50%, LCx 25%, RCA no atherosclerosis. Most places FFR > 0.8. D1 with FFR 0.74 proximally consistent with hemodynamically sifgnificant lesion. However having reviewed study with cardiologist reading exam- D1 is very small vessel ( 3mm) so even though lesion was significant, the vessel is small with a very small territory. Medical management was indicated and there was no indication for cath given small size of vessel.      In October pt called office due to uncontrolled BP. Was started on triamterene/hydrochlorothiazide- since then BP was better controlled running 120s-130s/70smmHg with HR 60s-70s bpm. Her dizziness has resolved and she has no CP or SOB. Does have minimal ROBLERO though improved. Is walking 1.5 miles at mall 3-4 x per week. Has balance issues/ issues with clumsiness, occasionally dragging her right leg. Was having frequent falls ( no dizziness presyncope or syncope) but much better now that using her walker. Continues to work with her neurologist ( has neuropathy). Has no palpitations and has no myalgias or muscle weakness from statin.        ROS: Full 10 pt review of symptoms of negative unless discussed above.     Problems:   Patient Active Problem List   Diagnosis   • Chronic sphenoidal sinusitis   • Nasal lesion   • Primary hypertension   • Coronary artery disease involving native coronary artery of native heart without angina pectoris   • Major depressive disorder in partial remission   • Prostate cancer (Multi)   • Abnormal cardiovascular stress test   • Acquired hallux limitus of left foot   • Altitude sickness   • Anal fissure   • Asthmatic bronchitis (HHS-HCC)   • Breast complaint   • Cataract   • Cervical spondylosis   • Chronic non-seasonal allergic rhinitis   • Chronic  obstructive pulmonary disease (Multi)   • Chronotropic incompetence   • Closed fracture of distal end of radius   • Closed transverse fracture of left patella   • CME (cystoid macular edema), right   • Cold sore   • Herpes labialis   • Decreased activity tolerance   • Degeneration of lumbar or lumbosacral intervertebral disc   • Dense breast tissue on mammogram   • Dry eye syndrome of left eye   • Endophthalmitis   • Flatulence/gas pain/belching   • Fracture of bone   • Frequent falls   • Gastroesophageal reflux disease without esophagitis   • H/O adenomatous polyp of colon   • Heart palpitations   • History of depression   • Dyslipidemia, goal LDL below 70   • Hypokalemia   • Infection due to yeast   • Intraocular pressure increase, right   • Ischemia of retina of right eye   • Left carpal tunnel syndrome   • Left ventricular hypertrophy by electrocardiogram   • Lip laceration   • Loss of height   • Low back pain   • Lumbar disc disease with radiculopathy   • Lumbar stenosis with neurogenic claudication   • Migraine without aura   • Mild cognitive impairment   • Mood disorder in full remission (CMS-HCC)   • Non-melanoma skin cancer   • Non-rheumatic mitral regurgitation   • Nonrheumatic aortic valve insufficiency   • Osteoporosis   • Pain in joint of right knee   • Partial blindness   • Patellar fracture   • PCO (posterior capsular opacification), left   • Peripheral neuropathy   • Achilles tendinitis, left leg   • Acute cystitis without hematuria   • Postoperative pain   • Pseudophakia of both eyes   • 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)   • Multiple pulmonary nodules   • Foot pain   • S/P lumbar fusion   • Inflammation of sacroiliac joint (CMS-HCC)   • Sarcoidosis of lung (Multi)   • Dyspnea on effort   • Tachycardia   • Trochanteric bursitis of left hip   • Balance problems     Medical History:   Past Medical History:   Diagnosis Date   • Asthma    • Cataract    • Chronic sphenoidal  sinusitis    • Colon polyp    • COPD (chronic obstructive pulmonary disease) (Multi)    • Coronary artery disease    • Depression    • Epistaxis    • GERD (gastroesophageal reflux disease)    • Hyperlipidemia    • Hypertension    • Lumbar disc disease    • Migraine    • Nasal lesion    • Non-rheumatic mitral regurgitation    • Nonrheumatic aortic (valve) insufficiency    • Osteoporosis    • Peripheral neuropathy    • Pulmonary nodules    • Pulmonary sarcoidosis (Multi)     in remission   • Spinal stenosis     lumbar   • Vision loss     right eye     Surgical Hx:   Past Surgical History:   Procedure Laterality Date   • ACHILLES TENDON SURGERY Left    • ACHILLES TENDON SURGERY Right    • CATARACT EXTRACTION  2017   • COLONOSCOPY  2021   • CT ANGIO CORONARY ART WITH HEARTFLOW IF SCORE >30%  08/10/2017    CT HEART CORONARY ANGIOGRAM 8/10/2017 [facility]   • CT ANGIO CORONARY ART WITH HEARTFLOW IF SCORE >30%  10/07/2022    CT HEART CORONARY ANGIOGRAM 10/7/2022 [facility]   • EYE SURGERY Right     infection   • FOOT SURGERY Right    • HEMORRHOID SURGERY     • LUMBAR LAMINECTOMY  03/2022   • MOUTH SURGERY      Tooth Extraction   • NASAL TURBINATE REDUCTION     • RHINOPLASTY     • SPINE SURGERY  10/2022   • WRIST SURGERY Left 07/2021      Social Hx:   Tobacco Use: Low Risk  (4/8/2025)    Patient History    • Smoking Tobacco Use: Never    • Smokeless Tobacco Use: Never    • Passive Exposure: Not on file     Alcohol Use: Not on file     Family Hx:   Family History   Problem Relation Name Age of Onset   • Cancer Mother     • Heart disease Mother     • Alzheimer's disease Mother     • Cancer Father     • Heart disease Father     • Dementia Father        Exam:   Vitals: There were no vitals filed for this visit.  Wt Readings from Last 5 Encounters:   04/08/25 74.8 kg (165 lb)   04/03/25 74.8 kg (165 lb)   02/05/25 74.8 kg (165 lb)   01/16/25 74.8 kg (165 lb)   12/30/24 78.4 kg (172 lb 12.8 oz)      Physical  Exam  Labs:   Recent Labs     04/17/24  1018 10/17/22  0647 10/16/22  0822 10/15/22  1000 10/07/22  1336 06/09/22  1033 06/14/21  1141 09/01/20  0925   WBC 9.5 15.8* 19.2* 12.8* 8.0 7.4 6.2 7.1   HGB 14.6 14.7 15.6 12.5 14.1 13.6 14.0 14.8   HCT 47.3* 45.3 48.3* 39.9 44.6 44.2 43.8 47.5*    234 252 182 225 209 205 206   MCV 93 91 91 92 92 92 91 92     Recent Labs     02/05/25  1439 04/18/24  1141 04/17/24  1018 07/03/23  1155 10/20/22  1015 10/19/22  1050 10/17/22  0647 10/16/22  0822    144 142 141 139 143 140 144   K 4.5 4.3 5.6* 4.2 2.9* 3.2* 3.4* 3.4*    105 103 104 100 102 102 107   BUN 27* 25* 27* 17 12 16 16 18   CREATININE 0.97 0.92 0.96 0.91 0.57 0.55 0.57 0.72      Recent Labs     09/01/20  0925   BNP 66     Lab Results   Component Value Date    CHOL 154 07/08/2024    HDL 50.6 07/08/2024    LDLF 65 07/03/2023    TRIG 182 (H) 07/08/2024     Lab Results   Component Value Date    LDLCALC 67 07/08/2024      Notable Studies: imaging personally reviewed and summarized by me below  EKG:  No results found. However, due to the size of the patient record, not all encounters were searched. Please check Results Review for a complete set of results.      Echo:  - Echo (9/10/2020)  PHYSICIAN INTERPRETATION:  Left Ventricle: The left ventricular systolic function is normal, with an estimated ejection fraction of 60-65%. There are no regional wall motion abnormalities. The left ventricular cavity size is normal. There is moderate eccentric left ventricular hypertrophy. Spectral Doppler shows a pseudonormal pattern of left ventricular diastolic filling.  Left Atrium: The left atrium is moderately dilated.  Right Ventricle: The right ventricle is normal in size. There is low normal right ventricular systolic function.  Right Atrium: The right atrium is normal in size.  Aortic Valve: The aortic valve is trileaflet. There is no evidence of aortic valve regurgitation. The peak instantaneous gradient of the  aortic valve is 13.5 mmHg.  Mitral Valve: The mitral valve is normal in structure. There is mild mitral annular calcification. There is trace mitral valve regurgitation.  Tricuspid Valve: The tricuspid valve is structurally normal. There is trace tricuspid regurgitation. The Doppler estimated RVSP is slightly elevated at 33.5 mmHg.  Pulmonic Valve: The pulmonic valve is not well visualized. The pulmonic valve regurgitation was not well visualized.  Pericardium: There is no pericardial effusion noted.  Aorta: The aortic root is normal.  Systemic Veins: The inferior vena cava appears to be of normal size. There is IVC inspiratory collapse greater than 50%.  In comparison to the previous echocardiogram(s): Compared with study from 7/21/2015,. Compared with the prior exam from 7/21/2015 there is still preserved LV systolic funciton, however the LA size has greatly increased and there is now pseudonormal diastolic function. The RVSP is similar. Previously was slightly increased at 31mmHg.        CONCLUSIONS:   1. The left ventricular systolic function is normal with a 60-65% estimated ejection fraction.   2. Poorly visualized anatomical structures due to suboptimal image quality.   3. Spectral Doppler shows a pseudonormal pattern of left ventricular diastolic filling.   4. There is moderate eccentric left ventricular hypertrophy.   5. The left atrium is moderately dilated.   6. Slightly elevated RVSP.   7. Compared with the prior exam from 7/21/2015 there is still preserved LV systolic funciton, however the LA size has greatly increased and there is now pseudonormal diastolic function. The RVSP is similar. Previously was slightly increased at 31mmHg.        Catheterization:  - [facility] (9/15/2020)  CONCLUSIONS:   1. Non-obstructive CAD (40-50% ostial LAD) in a codominant system.   2. Tortuous coronary arteries.   3. Elevated LVEDP of 25 mmHg.        CPET 9/30/2020  Impression:   1. Abnormal cardiopulmonary exercise test.   2.  The test was submaximal limiting interpretation. RER did not exceed 1, HR max was only 63% predicted. No ventilatory threshold was achieved.   3. The patient was hypertensive at rest 154/78.   4. The HR response to exercise seemed blunted, 66->92. Significant exercise motion artifact limits interpretation of exercise ECG. No ischemic changes were present during recovery.   5. There was no evidence of respiratory limitation during this limited exam. Breathing reserve was 56%, no significant desaturation was seen. There was a normal drop of dead space with exercise.   6. Overall interpretation is that of deconditioning. Cardiac limitation may be present as Chronotropic response seemed blunted and the patient was hypertensive at rest.        cCTA with heartflow 10/7/2022:   IMPRESSION:  1. No significant left main coronary disease.  2. Mixed atherosclerosis involving the proximal LAD resulting in  50-75% luminal stenosis. Study was sent to heart [employer] for evaluation  of FFR CT. Report will be addended once results are available.  3. Mild calcified atherosclerosis involving the proximal LCX  resulting in less than 25% luminal stenosis.  4. Partially visualized nodularity in the right upper lobe which was  previously seen on chest CT 08/24/2029. Recommend continued follow-up  with chest CT for full characterization.  eart flow results:  First Diagonal branch FFRCT 0.74 proximally, suggestive of  hemodynamically-significant lesion  Remainder of the coronary arteries FFRCT>0.8, reflecting  non-hemodynamically lesion              Current Outpatient Medications   Medication Instructions   • acetaminophen (TYLENOL 8 HOUR) 650 mg, Every 8 hours PRN   • amLODIPine (NORVASC) 5 mg, oral, Daily   • amoxicillin-pot clavulanate (Augmentin) 875-125 mg tablet 1 tablet, oral, 2 times daily   • aspirin (ADULT LOW DOSE ASPIRIN) 81 mg   • atorvastatin (LIPITOR) 40 mg, oral, Daily   • benzonatate (TESSALON) 200 mg, oral, 3 times daily PRN,  Do not crush or chew.   • budesonide-formoteroL (Symbicort) 160-4.5 mcg/actuation inhaler 2 puffs, inhalation, 2 times daily RT, Rinse mouth with water after use to reduce aftertaste and incidence of candidiasis. Do not swallow.   • calcium carbonate-vitamin D3 500 mg-5 mcg (200 unit) tablet 1 tablet, Daily   • cloNIDine (CATAPRES) 0.1 mg, oral, Daily   • escitalopram (LEXAPRO) 20 mg, Daily RT   • ezetimibe (ZETIA) 10 mg, oral, Nightly   • gabapentin (NEURONTIN) 300 mg, Nightly   • lamoTRIgine (LAMICTAL) 100 mg, 2 times daily   • losartan (COZAAR) 100 mg, oral, Daily   • montelukast (SINGULAIR) 10 mg, oral, Daily, as directed   • traZODone (Desyrel) 100 mg tablet    • triamterene-hydrochlorothiazid (Dyazide) 37.5-25 mg capsule 1 capsule, oral, Every morning   • trospium (Sanctura) 20 mg tablet 1/2 to 1 twice a day as needed for urinary symptoms     Impressions and Plan:    #      Patient Instructions:  If you have any questions or need cardiac medication refills, please call my office at [phone],      To reach my office please call [phone]  To schedule an appointment call [phone].          ____________________________________________________________  Rea Ramirez MD  Division of Cardiovascular Medicine  [facility]    10/2022 showing nonsignifiant stenoses except for in D1 which was a small vessel( 3mm) that would not warrant cardiac cath. Pursuing medical therapy at this time. She is tolerating her atorvastatin well without myalgias and a good response with LDL at target. Her BP is now well controlled following addition of triamterene/hydrochlorothiazide. Currently denies CP or SOB.  Plan  1. CAD- atorvastatin, zetia losartan and aspirin.  2. hyperlipidemia- continue atorvastatin 40 mg daily and zetia. LDL target <70 . At target July 2024. ( Note her triglycerides were a bit elevated)   3. HTN- BP in office now adequately controlled, not really checking at home. To continue clonidine .1mg, losartan 100 mg, and amlodipine 5 mg daily and triamterene/hydrochlorothiazide 37.5/25. . To start checking BP and HR at home  4. Dyspnea- resolved  5. sarcoid- rx of lung sarcoid as per Dr Gonzales.  6. Work on increasing level of exercise as allowed by orthopedic and back issues.    Return to clinic in 4 months with lipid panel and CMP 1 week prior to visit.     Patient Instructions:  If you have any questions or need cardiac medication refills, please call my office at [phone],      To reach my office please call [phone]  To schedule an appointment call [phone].          ____________________________________________________________  Rea Ramirez MD  Division of Cardiovascular Medicine  [facility] [place]  [facility]

## 2025-04-14 NOTE — PATIENT INSTRUCTIONS
1. Coronary artery disease. Continue atorvastatin 40 mg and zetia (LDL at target 7/2024) and aspirin.  2. Dyspnea- Sarcoid of lung as per Dr Gonzales. Improved with inhaler and nebulizer. Currently no shortness of breath on exertion.  3. Hypertension- Continue the  losartan 100 mg, clonidine 0.1 mg daily, triamterene/hydrochlorothiazide 37.5/25 mg and amlodipine 5 mg daily. Check home BP and HR and log results for review at next visit. BP currently well controlled.   4. Hyperlipidemia- continue atorvastatin 40 mg daily and zetia 10 mg daily. Your LDL target is < 70.   5. Continue current level of exercise and continue working on exercises to improve leg strength.     Return to clinic in 4 months with lipid panel and complete metabolic panel 1 week prior to visit. ( Blood work)

## 2025-04-15 LAB
ATRIAL RATE: 73 BPM
P AXIS: 93 DEGREES
P OFFSET: 151 MS
P ONSET: 115 MS
PR INTERVAL: 194 MS
Q ONSET: 212 MS
QRS COUNT: 12 BEATS
QRS DURATION: 108 MS
QT INTERVAL: 438 MS
QTC CALCULATION(BAZETT): 482 MS
QTC FREDERICIA: 467 MS
R AXIS: -33 DEGREES
T AXIS: 59 DEGREES
T OFFSET: 431 MS
VENTRICULAR RATE: 73 BPM

## 2025-04-21 ENCOUNTER — APPOINTMENT (OUTPATIENT)
Dept: CARDIOLOGY | Facility: HOSPITAL | Age: 80
End: 2025-04-21
Payer: MEDICARE

## 2025-05-09 ENCOUNTER — OFFICE VISIT (OUTPATIENT)
Dept: URGENT CARE | Age: 80
End: 2025-05-09
Payer: MEDICARE

## 2025-05-09 VITALS
BODY MASS INDEX: 24.44 KG/M2 | OXYGEN SATURATION: 99 % | HEIGHT: 69 IN | HEART RATE: 60 BPM | DIASTOLIC BLOOD PRESSURE: 74 MMHG | WEIGHT: 165 LBS | SYSTOLIC BLOOD PRESSURE: 127 MMHG | TEMPERATURE: 98.6 F | RESPIRATION RATE: 18 BRPM

## 2025-05-09 DIAGNOSIS — B34.8 RHINOVIRUS: ICD-10-CM

## 2025-05-09 DIAGNOSIS — J06.9 URI WITH COUGH AND CONGESTION: Primary | ICD-10-CM

## 2025-05-09 DIAGNOSIS — J02.9 SORE THROAT: ICD-10-CM

## 2025-05-09 LAB
POC HUMAN RHINOVIRUS PCR: POSITIVE
POC INFLUENZA A VIRUS PCR: NEGATIVE
POC INFLUENZA B VIRUS PCR: NEGATIVE
POC RESPIRATORY SYNCYTIAL VIRUS PCR: NEGATIVE
POC STREPTOCOCCUS PYOGENES (GROUP A STREP) PCR: NEGATIVE

## 2025-05-09 PROCEDURE — 3074F SYST BP LT 130 MM HG: CPT | Performed by: PHYSICIAN ASSISTANT

## 2025-05-09 PROCEDURE — 87651 STREP A DNA AMP PROBE: CPT | Performed by: PHYSICIAN ASSISTANT

## 2025-05-09 PROCEDURE — 1036F TOBACCO NON-USER: CPT | Performed by: PHYSICIAN ASSISTANT

## 2025-05-09 PROCEDURE — 3078F DIAST BP <80 MM HG: CPT | Performed by: PHYSICIAN ASSISTANT

## 2025-05-09 PROCEDURE — 1159F MED LIST DOCD IN RCRD: CPT | Performed by: PHYSICIAN ASSISTANT

## 2025-05-09 PROCEDURE — 99214 OFFICE O/P EST MOD 30 MIN: CPT | Performed by: PHYSICIAN ASSISTANT

## 2025-05-09 PROCEDURE — 87631 RESP VIRUS 3-5 TARGETS: CPT | Performed by: PHYSICIAN ASSISTANT

## 2025-05-09 RX ORDER — BROMPHENIRAMINE MALEATE, PSEUDOEPHEDRINE HYDROCHLORIDE, AND DEXTROMETHORPHAN HYDROBROMIDE 2; 30; 10 MG/5ML; MG/5ML; MG/5ML
10 SYRUP ORAL 4 TIMES DAILY PRN
Qty: 250 ML | Refills: 0 | Status: SHIPPED | OUTPATIENT
Start: 2025-05-09 | End: 2025-05-19

## 2025-05-09 ASSESSMENT — ENCOUNTER SYMPTOMS: SORE THROAT: 1

## 2025-05-09 NOTE — PROGRESS NOTES
"Subjective   Patient ID: Luis Ann is a 79 y.o. female. They present today with a chief complaint of Sore Throat (Pt has sore throat and earache ).    History of Present Illness  Patient is a very pleasant 79-year-old white female, past medical history of hyperlipidemia, hypertension, presented to clinic with complaint of upper respiratory congestion.  Patient is reporting approximately 2-day history of upper respiratory congestion rhinorrhea and postnasal drainage leading to a cough.  Denies sputum production.  No fever or chills.  No chest pain or shortness of breath.  She is reporting an associated sore throat as well.  Denies any dysphagia odynophagia trismus drooling or change in voice.  No abdominal pain, nausea, vomiting.  No numbness tingling or focal weakness.  States she has been using Robitussin cold and flu at home with good short-term relief of her symptoms.  No further complaints at this time.  No rashes.      Sore Throat         Past Medical History  Allergies as of 05/09/2025    (No Known Allergies)       Prescriptions Prior to Admission[1]       Medical History[2]    Surgical History[3]     reports that she has never smoked. She has never used smokeless tobacco. She reports that she does not currently use alcohol. She reports that she does not currently use drugs.    Review of Systems  Review of Systems   HENT:  Positive for sore throat.                                   Objective    Vitals:    05/09/25 1242   BP: 127/74   BP Location: Left arm   Patient Position: Sitting   BP Cuff Size: Adult   Pulse: 60   Resp: 18   Temp: 37 °C (98.6 °F)   TempSrc: Oral   SpO2: 99%   Weight: 74.8 kg (165 lb)   Height: 1.753 m (5' 9\")     No LMP recorded. Patient is postmenopausal.    Physical Exam  General: Vitals Noted. No distress. Normocephalic.     HEENT: TMs normal, EOMI, normal conjunctiva, patent nares with erythematous edematous and appear nasal turbinates and clear rhinorrhea bilaterally.  Posterior " oropharynx with signs of postnasal drainage without any erythema swelling or tonsillar exudate.  Uvula is in the midline and nonedematous.  No drooling.  No trismus.    Neck: Supple with no adenopathy.     Cardiac: Regular Rate and Rhythm. No murmur.     Pulmonary: Equal breath sounds bilaterally. No wheezes, rhonchi, or rales.    Abdomen: Soft, non-tender, with normal bowel sounds.     Musculoskeletal: Moves all extremities, no effusion, no edema.     Skin: No obvious rashes.  Procedures    Point of Care Test & Imaging Results from this visit    Imaging  No results found.    Cardiology, Vascular, and Other Imaging  No other imaging results found for the past 2 days      Diagnostic study results (if any) were reviewed by Mamadou Mays PA-C.    Assessment/Plan   Allergies, medications, history, and pertinent labs/EKGs/Imaging reviewed by Mamadou Mays PA-C.     Medical Decision Making  Patient was seen eval in the clinic for chief complaint of upper respiratory congestion.  On exam patient is nontoxic very well-appearing respite comfortably no acute distress.  Vital signs are stable, afebrile.  Chest is clear, heart is regular, belly is diffusely soft and nontender.  ENT examination as above concerning for viral illness.  Spot fire respiratory panel was performed and returned positive for rhinovirus.  Will advise continued supportive care measures at home including plenty of fluids, rest, Tylenol or Profen as needed, and advised she may continue Robitussin cold and flu if this is helping.  Will provide prescription of Bromfed-DM with instructions to only use this if she is not taking the Robitussin.  Advise she follow with her primary care physician in the next week.  We discharged home at this time.  Reviewed my impression, plan, strict return report to ED precautions with the patient.  She expresses understanding and agreement plan of care.    Orders and Diagnoses  Diagnoses and all orders for this  visit:  Sore throat  -     POCT SPOTFIRE R/ST Panel Mini w/Strep A ([facility]) manually resulted        Medical Admin Record      Follow Up Instructions  No follow-ups on file.    Patient disposition: Home    Electronically signed by Mamadou Mays PA-C  1:22 PM         [1] (Not in a hospital admission)  [2]   Past Medical History:  Diagnosis Date    Asthma     Cataract     Chronic sphenoidal sinusitis     Colon polyp     COPD (chronic obstructive pulmonary disease) (Multi)     Coronary artery disease     Depression     Epistaxis     GERD (gastroesophageal reflux disease)     Hyperlipidemia     Hypertension     Lumbar disc disease     Migraine     Nasal lesion     Non-rheumatic mitral regurgitation     Nonrheumatic aortic (valve) insufficiency     Osteoporosis     Peripheral neuropathy     Pulmonary nodules     Pulmonary sarcoidosis (Multi)     in remission    Spinal stenosis     lumbar    Vision loss     right eye   [3]   Past Surgical History:  Procedure Laterality Date    ACHILLES TENDON SURGERY Left     ACHILLES TENDON SURGERY Right     CATARACT EXTRACTION  2017    COLONOSCOPY  2021    CT ANGIO CORONARY ART WITH HEARTFLOW IF SCORE >30%  08/10/2017    CT HEART CORONARY ANGIOGRAM 8/10/2017 [facility]    CT ANGIO CORONARY ART WITH HEARTFLOW IF SCORE >30%  10/07/2022    CT HEART CORONARY ANGIOGRAM 10/7/2022 [facility]    EYE SURGERY Right     infection    FOOT SURGERY Right     HEMORRHOID SURGERY      LUMBAR LAMINECTOMY  03/2022    MOUTH SURGERY      Tooth Extraction    NASAL TURBINATE REDUCTION      RHINOPLASTY      SPINE SURGERY  10/2022    WRIST SURGERY Left 07/2021

## 2025-05-14 ENCOUNTER — OFFICE VISIT (OUTPATIENT)
Dept: URGENT CARE | Age: 80
End: 2025-05-14
Payer: MEDICARE

## 2025-05-14 ENCOUNTER — ANCILLARY PROCEDURE (OUTPATIENT)
Dept: URGENT CARE | Age: 80
End: 2025-05-14
Payer: MEDICARE

## 2025-05-14 VITALS
DIASTOLIC BLOOD PRESSURE: 65 MMHG | BODY MASS INDEX: 23.7 KG/M2 | SYSTOLIC BLOOD PRESSURE: 126 MMHG | WEIGHT: 160 LBS | OXYGEN SATURATION: 95 % | HEIGHT: 69 IN | RESPIRATION RATE: 17 BRPM | TEMPERATURE: 99.2 F | HEART RATE: 84 BPM

## 2025-05-14 DIAGNOSIS — R05.1 ACUTE COUGH: ICD-10-CM

## 2025-05-14 DIAGNOSIS — J40 BRONCHITIS: Primary | ICD-10-CM

## 2025-05-14 PROCEDURE — 71046 X-RAY EXAM CHEST 2 VIEWS: CPT | Performed by: PHYSICIAN ASSISTANT

## 2025-05-14 RX ORDER — PREDNISONE 20 MG/1
40 TABLET ORAL DAILY
Qty: 10 TABLET | Refills: 0 | Status: SHIPPED | OUTPATIENT
Start: 2025-05-14 | End: 2025-05-19

## 2025-05-14 ASSESSMENT — ENCOUNTER SYMPTOMS: COUGH: 1

## 2025-05-14 NOTE — PROGRESS NOTES
"Subjective   Patient ID: Luis Ann is a 79 y.o. female. They present today with a chief complaint of Cough (Cough began 5/11/25. No fevers.).    History of Present Illness    Cough      This is a 79-year-old female presents to urgent care for cough.  Patient seen in urgent care 5/9 and diagnosed with rhinovirus.  States that she is now developed a cough which is worsening.  Endorses fatigue.  The cough is productive.  Denies fevers, chills, shortness of breath, chest pain.  She has been using Robitussin.  Past Medical History  Allergies as of 05/14/2025    (No Known Allergies)       Prescriptions Prior to Admission[1]     Medical History[2]    Surgical History[3]     reports that she has never smoked. She has never used smokeless tobacco. She reports that she does not currently use alcohol. She reports that she does not currently use drugs.    Review of Systems  Review of Systems   Respiratory:  Positive for cough.    All other systems reviewed and are negative.                                 Objective    Vitals:    05/14/25 1405   BP: 126/65   BP Location: Right arm   Patient Position: Sitting   BP Cuff Size: Small adult   Pulse: 84   Resp: 17   Temp: (!) 38 °C (100.4 °F)   TempSrc: Oral   SpO2: 95%   Weight: 72.6 kg (160 lb)   Height: 1.753 m (5' 9\")     No LMP recorded. Patient is postmenopausal.    Physical Exam  Physical Exam:    General: Vitals noted no distress.  Febrile.  Normal phonation, no stridor, no trismus    ENT: Left TM is unremarkable. Right TM is unremarkable. Left external auditory canal is unremarkable. Right external auditory canal is unremarkable. Mastoids nontender. Normal conjunctival. Eyes are PERRLA. Posterior oropharynx is erythematous without tonsillar edema or exudate. Uvula is in the midline and nonedematous. No peritonsillar abscess. No Vince's Angina.  Nontender over the frontal and maxillary sinuses bilaterally    Neck: Supple. No meningismus through full range of motion. No " anterior cervical lymphadenopathy. No posterior cervical chain lymphadenopathy    Cardiac: Regular rate rhythm    Lungs: Good aeration throughout. No adventitious breath sounds.    Abdomen: Soft, nontender, nonsurgical throughout.     Extremities: No peripheral edema    Skin: No rash    Neuro: No gross neurological deficits      Procedures    Point of Care Test & Imaging Results from this visit  No results found for this visit on 05/14/25.   Imaging  No results found.    Cardiology, Vascular, and Other Imaging  No other imaging results found for the past 2 days      Diagnostic study results (if any) were reviewed by Milan Diaz PA-C.    Assessment/Plan   Allergies, medications, history, and pertinent labs/EKGs/Imaging reviewed by Milan Diaz PA-C.     Medical Decision Making  MDM:      Summary: This is a 79-year-old female here for cough. Vital signs were reviewed. Patient is well-appearing nontoxic on exam. Differential diagnosis includes but not limited to lingering viral URI, bronchitis, pneumonia.  Chest x-ray demonstrates no signs of pneumonia.  Did repeat patient's temperature was 99.2 Fahrenheit.  I will treat her for bronchitis with prednisone.  States that the best cough suppressant she is found for her is Robitussin.  She was encouraged to use this.  Stable for discharge. Follow-up with PCP. Return to urgent care or go to the emergency department if symptoms worsen or if new symptoms develop.    Orders and Diagnoses  Diagnoses and all orders for this visit:  Acute cough  -     XR chest 2 views; Future      Medical Admin Record      Patient disposition: Home    Electronically signed by Milan Diaz PA-C  2:14 PM           [1] (Not in a hospital admission)  [2]   Past Medical History:  Diagnosis Date    Asthma     Cataract     Chronic sphenoidal sinusitis     Colon polyp     COPD (chronic obstructive pulmonary disease) (Multi)     Coronary artery disease     Depression     Epistaxis     GERD  (gastroesophageal reflux disease)     Hyperlipidemia     Hypertension     Lumbar disc disease     Migraine     Nasal lesion     Non-rheumatic mitral regurgitation     Nonrheumatic aortic (valve) insufficiency     Osteoporosis     Peripheral neuropathy     Pulmonary nodules     Pulmonary sarcoidosis (Multi)     in remission    Spinal stenosis     lumbar    Vision loss     right eye   [3]   Past Surgical History:  Procedure Laterality Date    ACHILLES TENDON SURGERY Left     ACHILLES TENDON SURGERY Right     CATARACT EXTRACTION  2017    COLONOSCOPY  2021    CT ANGIO CORONARY ART WITH HEARTFLOW IF SCORE >30%  08/10/2017    CT HEART CORONARY ANGIOGRAM 8/10/2017 [facility]    CT ANGIO CORONARY ART WITH HEARTFLOW IF SCORE >30%  10/07/2022    CT HEART CORONARY ANGIOGRAM 10/7/2022 [facility]    EYE SURGERY Right     infection    FOOT SURGERY Right     HEMORRHOID SURGERY      LUMBAR LAMINECTOMY  03/2022    MOUTH SURGERY      Tooth Extraction    NASAL TURBINATE REDUCTION      RHINOPLASTY      SPINE SURGERY  10/2022    WRIST SURGERY Left 07/2021

## 2025-05-14 NOTE — PATIENT INSTRUCTIONS
"Acute bronchitis in adults    The Basics  Written by the doctors and editors at [facility]  What is bronchitis?  This is an infection that causes a cough. It happens when the tubes that carry air into the lungs, called the \"bronchi,\" get infected (figure 1).  Usually, bronchitis happens after a person gets a cold or the flu. The viruses that cause the cold or flu infect the bronchi and irritate them. Antibiotics do not work on infections caused by viruses.  Bronchitis can also happen when a person gets an infection called \"whooping cough,\" but this is much less common. Whooping cough is caused by bacteria that can infect the bronchi. Most people get vaccines to prevent whooping cough, but the vaccine doesn't always work. Your doctor will be able to tell if you have whooping cough by doing an exam and listening to your cough.  This article is about \"acute\" bronchitis. This is different from \"chronic\" bronchitis, which is a lung disease that most often affects people who smoke.  What are the symptoms of bronchitis?  The most common symptoms are:  ? A cough that can last up to a few weeks  ? Coughing up mucus that is clear, yellow, or green - Green mucus does not always mean that you have a bacterial infection.  You might also have other cold or flu symptoms, like a stuffy nose, sore throat, or headache. People with bronchitis do not usually get a fever.  Will I need tests?  Most people with bronchitis do not need a test. But if your doctor or nurse is not sure what is causing your cough, they might do tests. For example, they might order a chest X-ray. Or if they think that you might have COVID-19, they will test you for the virus that causes the infection.  How is bronchitis treated?  Bronchitis almost always goes away on its own. But the cough can take up to 3 weeks to get better, and sometimes even longer.  Doctors do not usually treat bronchitis with antibiotic medicines. That's because bronchitis is usually caused " by a virus, and antibiotics kill bacteria, not viruses. Also, antibiotics can actually cause other problems.  To feel better, you can treat your cold and flu symptoms. You can:  ? Get lots of rest, and drink plenty of liquids.  ? Drink hot tea.  ? Suck on cough drops or hard candy.  ? Take over-the-counter cough and cold medicines.  ? Breathe in warm, moist air, like steam from the shower. Some people find that it helps to use a cool-mist humidifier.  ? Take a pain-relieving medicine if you have cold or flu symptoms like headache, muscle aches, or joint pain.  Avoid smoking or being around others who smoke. This can make your cough worse.  How can I keep from getting bronchitis again?  You can reduce your chance of getting bronchitis again by keeping the germs that cause bronchitis out of your body. One of the best ways to do this is to wash your hands often with soap and water. If there is no sink nearby, you can use a hand gel with alcohol in it to clean your hands.  How can I keep from spreading germs?  In addition to washing your hands often, cover your mouth with your elbow when you sneeze or cough. Using your elbow keeps you from getting germs on your hands. If you use a tissue, throw the tissue away and wash your hands.  When should I call the doctor?  Call for advice if you have:  ? A fever higher than 100.4°F (38°C), or chills  ? Chest pain when you cough, trouble breathing, or coughing up blood  ? A barking cough that makes it hard to talk  ? Cough and weight loss that you cannot explain  ? Symptoms that are not getting better after 3 weeks

## 2025-06-20 DIAGNOSIS — I10 PRIMARY HYPERTENSION: ICD-10-CM

## 2025-06-23 RX ORDER — TRIAMTERENE AND HYDROCHLOROTHIAZIDE 37.5; 25 MG/1; MG/1
1 CAPSULE ORAL
Qty: 90 CAPSULE | Refills: 0 | Status: SHIPPED | OUTPATIENT
Start: 2025-06-23

## 2025-07-14 DIAGNOSIS — I25.10 CORONARY ARTERY DISEASE INVOLVING NATIVE CORONARY ARTERY OF NATIVE HEART WITHOUT ANGINA PECTORIS: ICD-10-CM

## 2025-07-18 ENCOUNTER — TELEPHONE (OUTPATIENT)
Dept: PULMONOLOGY | Facility: CLINIC | Age: 80
End: 2025-07-18
Payer: MEDICARE

## 2025-07-18 DIAGNOSIS — J45.40 MODERATE PERSISTENT ASTHMA WITHOUT COMPLICATION (HHS-HCC): ICD-10-CM

## 2025-07-19 ENCOUNTER — OFFICE VISIT (OUTPATIENT)
Dept: URGENT CARE | Age: 80
End: 2025-07-19
Payer: MEDICARE

## 2025-07-19 VITALS
TEMPERATURE: 99.3 F | HEART RATE: 68 BPM | WEIGHT: 160 LBS | DIASTOLIC BLOOD PRESSURE: 63 MMHG | BODY MASS INDEX: 23.7 KG/M2 | RESPIRATION RATE: 20 BRPM | OXYGEN SATURATION: 96 % | SYSTOLIC BLOOD PRESSURE: 109 MMHG | HEIGHT: 69 IN

## 2025-07-19 DIAGNOSIS — H66.91 RIGHT OTITIS MEDIA, UNSPECIFIED OTITIS MEDIA TYPE: Primary | ICD-10-CM

## 2025-07-19 RX ORDER — AMOXICILLIN AND CLAVULANATE POTASSIUM 875; 125 MG/1; MG/1
1 TABLET, FILM COATED ORAL 2 TIMES DAILY
Qty: 14 TABLET | Refills: 0 | Status: SHIPPED | OUTPATIENT
Start: 2025-07-19 | End: 2025-07-26

## 2025-07-19 ASSESSMENT — ENCOUNTER SYMPTOMS
SORE THROAT: 1
HEADACHES: 1

## 2025-07-19 NOTE — PROGRESS NOTES
"Subjective   Patient ID: Luis Ann is a 79 y.o. female. They present today with a chief complaint of No chief complaint on file..    History of Present Illness  Pt presents with R ear pain, R sided headache, swollen glands in neck and scratchy starting yesterday. No sick contacts. Not taking anything for sx at home. Has chronic pnd/allergies for which she takes claritin. No fever, chills, dizziness, ear drainage, hearing loss or tinnitus, cp, sob, changes in vision, nv, congestion, trouble swallowing, cough, numbness tingling weakness. Ha not sudden onset, not worst of life.       History provided by:  Patient      Past Medical History  Allergies as of 07/19/2025    (No Known Allergies)       Prescriptions Prior to Admission[1]     Medical History[2]    Surgical History[3]     reports that she has never smoked. She has never used smokeless tobacco. She reports that she does not currently use alcohol. She reports that she does not currently use drugs.    Review of Systems  Review of Systems   HENT:  Positive for ear pain and sore throat.    Neurological:  Positive for headaches.   All other systems reviewed and are negative.                                 Objective    Vitals:    07/19/25 1520   BP: 109/63   Pulse: 68   Resp: 20   Temp: 37.4 °C (99.3 °F)   SpO2: 96%   Weight: 72.6 kg (160 lb)   Height: 1.753 m (5' 9\")     No LMP recorded. Patient is postmenopausal.    Physical Exam  Vitals and nursing note reviewed.   Constitutional:       General: She is not in acute distress.     Appearance: Normal appearance. She is not ill-appearing, toxic-appearing or diaphoretic.   HENT:      Head: Normocephalic and atraumatic.      Jaw: There is normal jaw occlusion. No tenderness, swelling or pain on movement.      Salivary Glands: Right salivary gland is not diffusely enlarged or tender. Left salivary gland is not diffusely enlarged or tender.      Comments: No tenderness of temporal region. No palpable cord      Right " Ear: Ear canal and external ear normal. Tenderness present. No drainage or swelling. No middle ear effusion. There is no impacted cerumen. No foreign body. No mastoid tenderness. Tympanic membrane is not perforated, retracted or bulging.      Left Ear: Tympanic membrane, ear canal and external ear normal.      Ears:      Comments: There is cloudy, opaque white fluid behind the R TM with slight erythema      Nose: Nose normal.      Right Turbinates: Not enlarged, swollen or pale.      Left Turbinates: Not enlarged, swollen or pale.      Right Sinus: No maxillary sinus tenderness or frontal sinus tenderness.      Left Sinus: No maxillary sinus tenderness or frontal sinus tenderness.      Mouth/Throat:      Lips: Pink.      Mouth: Mucous membranes are moist.      Pharynx: Oropharynx is clear. Uvula midline. No pharyngeal swelling, oropharyngeal exudate, posterior oropharyngeal erythema, uvula swelling or postnasal drip.      Tonsils: No tonsillar exudate or tonsillar abscesses.      Comments: No uvular edema or deviation. No tonsillar edema, asymmetry, exudates or evidence of PTA. No trismus drooling or stridor. Speaking in full and clear sentences. Airway is patent. Tolerating oral secretions. No dental abnormality. No neck swelling. No lesions or petechiae. No sublingual or submandibular induration edema or tenderness. Mild b/l submandibular lymphadenopathy. No other lymphadenopathy appreciated.     Cardiovascular:      Rate and Rhythm: Normal rate and regular rhythm.      Pulses: Normal pulses.   Pulmonary:      Effort: Pulmonary effort is normal. No respiratory distress.      Breath sounds: No wheezing, rhonchi or rales.     Skin:     Findings: No rash.     Neurological:      General: No focal deficit present.      Mental Status: She is alert and oriented to person, place, and time.         Procedures    Point of Care Test & Imaging Results from this visit  No results found for this visit on 07/19/25.   Imaging  No  results found.    Cardiology, Vascular, and Other Imaging  No other imaging results found for the past 2 days      Diagnostic study results (if any) were reviewed by Carlee Aldrich PA-C.    Assessment/Plan   Allergies, medications, history, and pertinent labs/EKGs/Imaging reviewed by Calree Aldrich PA-C.     Medical Decision Making  Lower concern for lorna chilel, temporal arteritis, mastoiditis, malignant AOE, odontogenic infection, bacterial sinusitis   Follow up with pcp, return here to  as needed. ED precautions discussed       Orders and Diagnoses  Diagnoses and all orders for this visit:  Right otitis media, unspecified otitis media type  -     amoxicillin-clavulanate (Augmentin) 875-125 mg tablet; Take 1 tablet by mouth 2 times a day for 7 days.      Medical Admin Record      Patient disposition: Home    Electronically signed by Carlee Aldrich PA-C  6:22 PM           [1] (Not in a hospital admission)   [2]   Past Medical History:  Diagnosis Date    Asthma     Cataract     Chronic sphenoidal sinusitis     Colon polyp     COPD (chronic obstructive pulmonary disease) (Multi)     Coronary artery disease     Depression     Epistaxis     GERD (gastroesophageal reflux disease)     Hyperlipidemia     Hypertension     Lumbar disc disease     Migraine     Nasal lesion     Non-rheumatic mitral regurgitation     Nonrheumatic aortic (valve) insufficiency     Osteoporosis     Peripheral neuropathy     Pulmonary nodules     Pulmonary sarcoidosis (Multi)     in remission    Spinal stenosis     lumbar    Vision loss     right eye   [3]   Past Surgical History:  Procedure Laterality Date    ACHILLES TENDON SURGERY Left     ACHILLES TENDON SURGERY Right     CATARACT EXTRACTION  2017    COLONOSCOPY  2021    CT ANGIO CORONARY ART WITH HEARTFLOW IF SCORE >30%  08/10/2017    CT HEART CORONARY ANGIOGRAM 8/10/2017 [facility]    CT ANGIO CORONARY ART WITH HEARTFLOW IF SCORE >30%  10/07/2022    CT HEART CORONARY ANGIOGRAM  10/7/2022 [facility]    EYE SURGERY Right     infection    FOOT SURGERY Right     HEMORRHOID SURGERY      LUMBAR LAMINECTOMY  03/2022    MOUTH SURGERY      Tooth Extraction    NASAL TURBINATE REDUCTION      RHINOPLASTY      SPINE SURGERY  10/2022    WRIST SURGERY Left 07/2021

## 2025-07-21 RX ORDER — MONTELUKAST SODIUM 10 MG/1
10 TABLET ORAL DAILY
Qty: 30 TABLET | Refills: 0 | Status: SHIPPED | OUTPATIENT
Start: 2025-07-21

## 2025-07-21 NOTE — TELEPHONE ENCOUNTER
Pt called asking for a refill of her montelukast to be sent to the [facility] on file.

## 2025-07-23 ENCOUNTER — HOSPITAL ENCOUNTER (EMERGENCY)
Facility: HOSPITAL | Age: 80
Discharge: HOME | End: 2025-07-23
Attending: EMERGENCY MEDICINE
Payer: MEDICARE

## 2025-07-23 ENCOUNTER — APPOINTMENT (OUTPATIENT)
Dept: RADIOLOGY | Facility: HOSPITAL | Age: 80
End: 2025-07-23
Payer: MEDICARE

## 2025-07-23 VITALS
SYSTOLIC BLOOD PRESSURE: 104 MMHG | OXYGEN SATURATION: 95 % | DIASTOLIC BLOOD PRESSURE: 62 MMHG | TEMPERATURE: 97.9 F | WEIGHT: 156 LBS | HEIGHT: 69 IN | BODY MASS INDEX: 23.11 KG/M2 | RESPIRATION RATE: 20 BRPM | HEART RATE: 66 BPM
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
## 2025-07-23 DIAGNOSIS — S01.01XA LACERATION OF SCALP, INITIAL ENCOUNTER: ICD-10-CM

## 2025-07-23 PROCEDURE — 73502 X-RAY EXAM HIP UNI 2-3 VIEWS: CPT | Mod: RIGHT SIDE | Performed by: RADIOLOGY

## 2025-07-23 PROCEDURE — 72125 CT NECK SPINE W/O DYE: CPT | Performed by: RADIOLOGY

## 2025-07-23 PROCEDURE — 72125 CT NECK SPINE W/O DYE: CPT

## 2025-07-23 PROCEDURE — 99284 EMERGENCY DEPT VISIT MOD MDM: CPT | Mod: 25 | Performed by: EMERGENCY MEDICINE

## 2025-07-23 PROCEDURE — 2500000001 HC RX 250 WO HCPCS SELF ADMINISTERED DRUGS (ALT 637 FOR MEDICARE OP): Performed by: EMERGENCY MEDICINE

## 2025-07-23 PROCEDURE — 73502 X-RAY EXAM HIP UNI 2-3 VIEWS: CPT | Mod: RT

## 2025-07-23 PROCEDURE — 12002 RPR S/N/AX/GEN/TRNK2.6-7.5CM: CPT | Performed by: EMERGENCY MEDICINE

## 2025-07-23 PROCEDURE — 70450 CT HEAD/BRAIN W/O DYE: CPT | Performed by: RADIOLOGY

## 2025-07-23 PROCEDURE — 2500000004 HC RX 250 GENERAL PHARMACY W/ HCPCS (ALT 636 FOR OP/ED)

## 2025-07-23 PROCEDURE — 96376 TX/PRO/DX INJ SAME DRUG ADON: CPT | Mod: 59

## 2025-07-23 PROCEDURE — 70450 CT HEAD/BRAIN W/O DYE: CPT

## 2025-07-23 PROCEDURE — 96374 THER/PROPH/DIAG INJ IV PUSH: CPT | Mod: 59

## 2025-07-23 RX ORDER — FENTANYL CITRATE 50 UG/ML
INJECTION, SOLUTION INTRAMUSCULAR; INTRAVENOUS
Status: COMPLETED
Start: 2025-07-23 | End: 2025-07-23

## 2025-07-23 RX ORDER — ACETAMINOPHEN 325 MG/1
975 TABLET ORAL ONCE
Status: COMPLETED | OUTPATIENT
Start: 2025-07-23 | End: 2025-07-23

## 2025-07-23 RX ORDER — FENTANYL CITRATE 50 UG/ML
25 INJECTION, SOLUTION INTRAMUSCULAR; INTRAVENOUS ONCE
Status: COMPLETED | OUTPATIENT
Start: 2025-07-23 | End: 2025-07-23

## 2025-07-23 RX ORDER — LIDOCAINE HYDROCHLORIDE 10 MG/ML
INJECTION, SOLUTION INFILTRATION; PERINEURAL
Status: DISCONTINUED
Start: 2025-07-23 | End: 2025-07-23 | Stop reason: WASHOUT

## 2025-07-23 RX ADMIN — ACETAMINOPHEN 975 MG: 325 TABLET ORAL at 14:31

## 2025-07-23 RX ADMIN — Medication 1 APPLICATION: at 13:00

## 2025-07-23 RX ADMIN — FENTANYL CITRATE 25 MCG: 0.05 INJECTION, SOLUTION INTRAMUSCULAR; INTRAVENOUS at 13:18

## 2025-07-23 RX ADMIN — FENTANYL CITRATE 25 MCG: 0.05 INJECTION, SOLUTION INTRAMUSCULAR; INTRAVENOUS at 14:29

## 2025-07-23 RX ADMIN — FENTANYL CITRATE 25 MCG: 50 INJECTION, SOLUTION INTRAMUSCULAR; INTRAVENOUS at 13:18

## 2025-07-23 RX ADMIN — FENTANYL CITRATE 25 MCG: 50 INJECTION, SOLUTION INTRAMUSCULAR; INTRAVENOUS at 14:29

## 2025-07-23 ASSESSMENT — PAIN DESCRIPTION - ONSET: ONSET: SUDDEN

## 2025-07-23 ASSESSMENT — PAIN SCALES - GENERAL
PAINLEVEL_OUTOF10: 6
PAINLEVEL_OUTOF10: 3
PAINLEVEL_OUTOF10: 8

## 2025-07-23 ASSESSMENT — PAIN DESCRIPTION - DESCRIPTORS: DESCRIPTORS: ACHING;TENDER

## 2025-07-23 ASSESSMENT — PAIN - FUNCTIONAL ASSESSMENT: PAIN_FUNCTIONAL_ASSESSMENT: 0-10

## 2025-07-23 ASSESSMENT — PAIN DESCRIPTION - PROGRESSION: CLINICAL_PROGRESSION: NOT CHANGED

## 2025-07-23 ASSESSMENT — PAIN DESCRIPTION - LOCATION: LOCATION: HEAD

## 2025-07-23 ASSESSMENT — PAIN DESCRIPTION - PAIN TYPE: TYPE: ACUTE PAIN

## 2025-07-23 ASSESSMENT — PAIN DESCRIPTION - ORIENTATION: ORIENTATION: LEFT;POSTERIOR

## 2025-07-23 ASSESSMENT — PAIN DESCRIPTION - FREQUENCY: FREQUENCY: CONSTANT/CONTINUOUS
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
## 2025-07-23 NOTE — ED PROVIDER NOTES
Emergency Department Provider Note     HPI  Patient is a 79-year-old female who presented to the emergency room after a mechanical fall.  She is not anticoagulated.  She states that she has a history of falls on occasion and today was bending over to reach for a cabinet and tried to open it when she lost her balance, fell and hit the back of her head against the ground.  She also may have landed onto her right hip but does not really have a significant amount of pain there and is in fact laying on it right now without difficulty.  She did not pass out, denies any neurologic deficits or nausea vomiting.  Other than pain to the back of her head she feels well.  She was feeling very well prior to the fall as well.  Her last tetanus shot was 7 months ago.      PMHx: As above  PSHx: Denies pertinent  FamilyHx: Denies pertinent  SocialHx: Denies  Allergies: Per EMR  Medications: See Medication Reconciliation     ROS  As above otherwise denies      Physical Exam    GENERAL: Awake and Alert, No Acute Distress  HEENT: AT/NC, PERRL, EOMI, Normal Oropharynx, No Signs of Dehydration  NECK: Normal Inspection, No JVD  CARDIOVASCULAR: RRR, No M/R/G  RESPIRATORY: CTA Bilaterally, No Wheezes, Rales or Rhonchi, Chest Wall Non-tender  ABDOMEN: Soft, non-tender abdomen, Normal Bowel Sounds, No Distention  BACK: No CVA Tenderness  SKIN: Scalp laceration measuring about 3 cm to the occipital region.  It is hemostatic normal Color, Warm, Dry, No Rashes   EXTREMITIES: Non-Tender, Full ROM, No Pedal Edema  NEURO: A&O x 3, Normal Motor and Sensation, Normal Mood and Affect    Nursing Assessment and Vitals Reviewed    Medical Decision  Patient is a 79-year-old female who presented to the emergency room after a mechanical fall.  She is not anticoagulated.  She states that she has a history of falls on occasion and today was bending over to reach for a cabinet and tried to open it when she lost her balance, fell and hit the back of her head  against the ground.  She also may have landed onto her right hip but does not really have a significant amount of pain there and is in fact laying on it right now without difficulty.  She did not pass out, denies any neurologic deficits or nausea vomiting.  Other than pain to the back of her head she feels well.  She was feeling very well prior to the fall as well.  Her last tetanus shot was 7 months ago.    On evaluation she is well-appearing and in no acute distress.  Lungs are clear and heart is regular.  Abdomen is soft nontender nondistended.  She has a scalp laceration as described above in the occipital region which is hemostatic and patient is otherwise neurologically intact.  She does not have any great pain to the right hip but given the fall and the discomfort she is ordered x-rays of the right hip as well.  Let is applied to the area.    Patient was given fentanyl for pain with improvement.  X-ray of the hip did not reveal any fractures.  CT of the head and C-spine did not show any acute intracranial pathology, cervical spine injury, traumatic fracture or dislocation.    Scalp laceration was thoroughly cleansed at bedside you are seeing saline.  She had staples applied after LET and patient tolerated the procedure very well.  She has remained neurologically intact without any other complaints in the ED.  She is discharged in stable condition to follow-up with her primary care physician.  She is educated on supportive care at home as well as signs and symptoms that should prompt and may return to emergency room.  Again her last tetanus shot was 7 months ago and she does not need 1 today in the ED.    Niurka Zamora MD  Emergency Medicine                                                       Niurka Zamora MD  07/23/25 [id number]

## 2025-07-23 NOTE — DISCHARGE INSTRUCTIONS
Please make sure to keep the area clean.  Do not attempt to rub it while cleaning.  Follow-up closely with your primary care doctor in a few days for suture removal.  Return immediately if concerning symptoms, as discussed.  Return to the emergency room if your primary care physician is not able to remove your sutures.  Take Tylenol for pain or discomfort.

## 2025-07-23 NOTE — ED TRIAGE NOTES
Pt arrives from home via EMS, pt reports standing in kitchen leaning/reaching to right into cupboard, lost balance, fell, ground level, onto R side, onto tile floor, hit L posterior head on floor, denies neck/back pn/LOC, uses daily aspirin. Pt c/o L posterior head pain w/ hematoma and small laceration (bleeding controlled), and mild R lateral hip pn and mild R lateral knee pn, distal PMS intact.

## 2025-07-23 NOTE — TELEPHONE ENCOUNTER
Just an FYI, per patient's  she fell about 1hr ago. She is now at the [facility] with a laceration to the crown of her head. She did hit her head pretty hard on the tile floor per her .

## 2025-07-23 NOTE — ED PROCEDURE NOTE
Procedure  Laceration Repair    Performed by: Niurka Zamora MD  Authorized by: Niurka Zamora MD    Consent:     Consent obtained:  Verbal    Consent given by:  Patient    Risks, benefits, and alternatives were discussed: yes      Risks discussed:  Infection and pain    Alternatives discussed:  No treatment  Universal protocol:     Patient identity confirmed:  Verbally with patient  Anesthesia:     Anesthesia method:  Topical application    Topical anesthetic:  LET  Laceration details:     Location:  Scalp    Scalp location:  Occipital    Length (cm):  3.5  Pre-procedure details:     Preparation:  Imaging obtained to evaluate for foreign bodies  Exploration:     Hemostasis achieved with:  Direct pressure    Imaging outcome: foreign body not noted      Wound exploration: entire depth of wound visualized      Contaminated: no    Treatment:     Area cleansed with:  Saline    Irrigation method:  Pressure wash and syringe    Visualized foreign bodies/material removed: no      Debridement:  None    Undermining:  None  Skin repair:     Repair method:  Staples    Number of staples:  5  Approximation:     Approximation:  Close  Repair type:     Repair type:  Simple               Niurka Zamora MD  07/23/25 2721

## 2025-08-01 ENCOUNTER — HOSPITAL ENCOUNTER (EMERGENCY)
Facility: HOSPITAL | Age: 80
Discharge: HOME | End: 2025-08-01
Payer: MEDICARE

## 2025-08-01 VITALS
HEART RATE: 73 BPM | DIASTOLIC BLOOD PRESSURE: 51 MMHG | HEIGHT: 69 IN | TEMPERATURE: 97.9 F | RESPIRATION RATE: 14 BRPM | WEIGHT: 155 LBS | BODY MASS INDEX: 22.96 KG/M2 | OXYGEN SATURATION: 97 % | SYSTOLIC BLOOD PRESSURE: 107 MMHG

## 2025-08-01 DIAGNOSIS — Z48.02 VISIT FOR SUTURE REMOVAL: Primary | ICD-10-CM

## 2025-08-01 PROCEDURE — 99281 EMR DPT VST MAYX REQ PHY/QHP: CPT

## 2025-08-01 ASSESSMENT — PAIN SCALES - GENERAL: PAINLEVEL_OUTOF10: 0 - NO PAIN

## 2025-08-01 ASSESSMENT — PAIN - FUNCTIONAL ASSESSMENT: PAIN_FUNCTIONAL_ASSESSMENT: 0-10

## 2025-08-01 NOTE — DISCHARGE INSTRUCTIONS
Return to the ED immediately if new or worsening symptoms  Please follow-up with your primary care doctor within 3 days

## 2025-08-01 NOTE — ED PROVIDER NOTES
HPI   Chief Complaint   Patient presents with    Suture / Staple Removal       79-year-old female presents to the ED today with a chief concern of needs staples removed.  Patient reports that 10 days ago she had staples placed in her head.  She reports that she had a fall.  She was seen and evaluated here and had imaging performed which was all negative.  She is requesting these to be removed as they told her to come back in 10 days.  She is currently asymptomatic.  No headache.  She feels like the wound is healing well.  She has no other symptoms or concerns at this time.      History provided by:  Patient   used: No            Patient History   Medical History[1]  Surgical History[2]  Family History[3]  Social History[4]    Physical Exam   ED Triage Vitals [08/01/25 1634]   Temperature Heart Rate Respirations BP   36.6 °C (97.9 °F) 73 14 107/51      Pulse Ox Temp Source Heart Rate Source Patient Position   97 % Temporal -- --      BP Location FiO2 (%)     -- --       Physical Exam  Constitutional: Vital signs per nursing notes.  Well developed, well nourished.  No acute distress.    Psychiatric: alert and oriented to person, place, and time; no abnormalities of mood or affect; memory intact  Eyes: PERRL; conjunctivae and lids normal; EOMI  ENT: Ears normal externally; face symmetric. voice normal  Neck: neck supple, no meningismus; trachea midline without deviation  Respiratory: normal respiratory effort and excursion; no rales, rhonchi, or wheezes; equal air entry  Cardiovascular: RRR, 2+ pulses extremities   Neurological: normal speech; CN II-XII grossly intact, normal motor and sensory function; no nystagmus; ; no ataxia.  GI: no distention, soft, nontender  : Deferred  Musculoskeletal: normal gait and station; normal digits and nails; normal to palpation; normal strength/tone; neurovascular status intact.  Skin: Has some bruising noted to the left side of the neck.  5 staples noted in the  back of the head with a hematoma.  There is blood and crusting around the area but no erythema.      ED Course & MDM   ED Course as of 08/01/25 1745   Fri Aug 01, 2025   1745 IMPRESSION:  NO ACUTE INTRACRANIAL PROCESS. SKULL INTACT      NO ACUTE FRACTURE OR SUBLUXATION IN THE CERVICAL SPINE      THIS REPORT SERVES AS THE DIAGNOSTIC INTERPRETATION FOR TWO EXAMS  PERFORMED CONCURRENTLY: CT BRAIN WITHOUT IV CONTRAST AND CT CERVICAL  SPINE WITHOUT IV CONTRAST      MACRO:  None      Signed by: Kenn Gutierrez 7/23/2025 1:09 PM  Dictation workstation:   [id number]   []      ED Course User Index  [MC] Herman David PA-C         Diagnoses as of 08/01/25 1745   Visit for suture removal                 No data recorded     Geraldine Coma Scale Score: 15 (08/01/25 1636 : Konrad Lawton RN)                           Medical Decision Making  79-year-old female presents to the ED today with a chief concern of needs staples removed.  Vital signs reassuring.  Patient overall appears well and is nontoxic-appearing.  Patient is fully neurologically intact with no acute neurological deficits.  She had her tetanus updated 7 months ago.  She had negative CT head and C-spine while in the ED. She is currently asymptomatic.  No indication for antibiotics at this time.  No evidence of infection.  Discussed impression and findings with patient she feels comfortable returning home.  We discussed very strict return precautions including returning for any new or worsening symptoms.  Patient is in agreement with this plan.  She will follow-up with the PCP within 3 days.  Discussed symptoms of subdural bleed.    Differential diagnosis: Laceration, cellulitis, deep space infection    Disposition/treatment  1.  See diagnosis    Shared decision-making was used patient feels comfortable returning home     Patient was advised to follow up with recommended provider in 1 day for another evaluation and exam. I advised patient/guardian to return or go to  closest emergency room immediately if symptoms change, get worse, new symptoms develop prior to follow up. If there is no improvement in symptoms in the next 24 hours they are advised to return for further evaluation and exam. I also explained the plan and treatment course. Patient/guardian is in agreement with plan, treatment course, and follow up and states verbally that they will comply.    Patient is homegoing. I discussed the differential; results and discharge plan with the patient and/or family/friend/caregiver if present.  I emphasized the importance of follow-up with the physician I referred them to in the timeframe recommended.  I explained reasons for the patient to return to the Emergency Department. They agreed that if they feel their condition is worsening or if they have any other concern they should call 911 immediately for further assistance. I gave the patient an opportunity to ask all questions they had and answered all of them accordingly. They understand return precautions and discharge instructions. The patient and/or family/friend/caregiver expressed understanding verbally and that they would comply.        This note has been transcribed using voice recognition and may contain grammatical errors, misplaced words, incorrect words, incorrect phrases or other errors.        Procedure  Suture Removal    Performed by: Herman David PA-C  Authorized by: Herman David PA-C    Consent:     Consent obtained:  Verbal    Consent given by:  Patient    Risks, benefits, and alternatives were discussed: yes      Risks discussed:  Bleeding, pain and wound separation    Alternatives discussed:  No treatment  Universal protocol:     Patient identity confirmed:  Verbally with patient and arm band  Procedure details:     Wound appearance:  No signs of infection, good wound healing and clean    Number of staples removed:  5  Post-procedure details:     Post-removal:  No dressing applied    Procedure completion:   Tolerated well, no immediate complications         [1]   Past Medical History:  Diagnosis Date    Asthma     Cataract     Chronic sphenoidal sinusitis     Colon polyp     COPD (chronic obstructive pulmonary disease) (Multi)     Coronary artery disease     Depression     Epistaxis     GERD (gastroesophageal reflux disease)     Hyperlipidemia     Hypertension     Lumbar disc disease     Migraine     Nasal lesion     Non-rheumatic mitral regurgitation     Nonrheumatic aortic (valve) insufficiency     Osteoporosis     Peripheral neuropathy     Pulmonary nodules     Pulmonary sarcoidosis (Multi)     in remission    Spinal stenosis     lumbar    Vision loss     right eye   [2]   Past Surgical History:  Procedure Laterality Date    ACHILLES TENDON SURGERY Left     ACHILLES TENDON SURGERY Right     CATARACT EXTRACTION  2017    COLONOSCOPY  2021    CT ANGIO CORONARY ART WITH HEARTFLOW IF SCORE >30%  08/10/2017    CT HEART CORONARY ANGIOGRAM 8/10/2017 [facility]    CT ANGIO CORONARY ART WITH HEARTFLOW IF SCORE >30%  10/07/2022    CT HEART CORONARY ANGIOGRAM 10/7/2022 [facility]    EYE SURGERY Right     infection    FOOT SURGERY Right     HEMORRHOID SURGERY      LUMBAR LAMINECTOMY  03/2022    MOUTH SURGERY      Tooth Extraction    NASAL TURBINATE REDUCTION      RHINOPLASTY      SPINE SURGERY  10/2022    WRIST SURGERY Left 07/2021   [3]   Family History  Problem Relation Name Age of Onset    Cancer Mother      Heart disease Mother      Alzheimer's disease Mother      Cancer Father      Heart disease Father      Dementia Father     [4]   Social History  Tobacco Use    Smoking status: Never    Smokeless tobacco: Never   Vaping Use    Vaping status: Never Used   Substance Use Topics    Alcohol use: Not Currently    Drug use: Not Currently     Comment: 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

## 2025-08-14 ENCOUNTER — HOSPITAL ENCOUNTER (EMERGENCY)
Facility: HOSPITAL | Age: 80
Discharge: HOME | End: 2025-08-14
Payer: MEDICARE

## 2025-08-14 VITALS
TEMPERATURE: 97.5 F | HEIGHT: 69 IN | WEIGHT: 155 LBS | BODY MASS INDEX: 22.96 KG/M2 | DIASTOLIC BLOOD PRESSURE: 62 MMHG | HEART RATE: 60 BPM | RESPIRATION RATE: 18 BRPM | OXYGEN SATURATION: 99 % | SYSTOLIC BLOOD PRESSURE: 106 MMHG

## 2025-08-14 DIAGNOSIS — S09.90XA INJURY OF HEAD, INITIAL ENCOUNTER: ICD-10-CM

## 2025-08-14 DIAGNOSIS — Z51.89 VISIT FOR WOUND CHECK: Primary | ICD-10-CM

## 2025-08-14 LAB
ALBUMIN SERPL-MCNC: 4.1 G/DL (ref 3.6–5.1)
ALP SERPL-CCNC: 114 U/L (ref 37–153)
ALT SERPL-CCNC: 18 U/L (ref 6–29)
ANION GAP SERPL CALCULATED.4IONS-SCNC: 8 MMOL/L (CALC) (ref 7–17)
AST SERPL-CCNC: 11 U/L (ref 10–35)
BILIRUB SERPL-MCNC: 0.7 MG/DL (ref 0.2–1.2)
BUN SERPL-MCNC: 26 MG/DL (ref 7–25)
CALCIUM SERPL-MCNC: 9.8 MG/DL (ref 8.6–10.4)
CHLORIDE SERPL-SCNC: 105 MMOL/L (ref 98–110)
CHOLEST SERPL-MCNC: 132 MG/DL
CHOLEST/HDLC SERPL: 2.2 (CALC)
CO2 SERPL-SCNC: 30 MMOL/L (ref 20–32)
CREAT SERPL-MCNC: 0.78 MG/DL (ref 0.6–1)
EGFRCR SERPLBLD CKD-EPI 2021: 78 ML/MIN/1.73M2
GLUCOSE SERPL-MCNC: 96 MG/DL (ref 65–99)
HDLC SERPL-MCNC: 61 MG/DL
LDLC SERPL CALC-MCNC: 53 MG/DL (CALC)
NONHDLC SERPL-MCNC: 71 MG/DL (CALC)
POTASSIUM SERPL-SCNC: 3.9 MMOL/L (ref 3.5–5.3)
PROT SERPL-MCNC: 5.8 G/DL (ref 6.1–8.1)
SODIUM SERPL-SCNC: 143 MMOL/L (ref 135–146)
TRIGL SERPL-MCNC: 92 MG/DL

## 2025-08-14 PROCEDURE — 99281 EMR DPT VST MAYX REQ PHY/QHP: CPT

## 2025-08-14 ASSESSMENT — PAIN SCALES - GENERAL: PAINLEVEL_OUTOF10: 0 - NO PAIN

## 2025-08-14 ASSESSMENT — PAIN - FUNCTIONAL ASSESSMENT: PAIN_FUNCTIONAL_ASSESSMENT: 0-10

## 2025-08-21 ENCOUNTER — OFFICE VISIT (OUTPATIENT)
Dept: CARDIOLOGY | Facility: HOSPITAL | Age: 80
End: 2025-08-21
Payer: MEDICARE

## 2025-08-21 VITALS
HEART RATE: 73 BPM | DIASTOLIC BLOOD PRESSURE: 81 MMHG | SYSTOLIC BLOOD PRESSURE: 126 MMHG | OXYGEN SATURATION: 93 % | WEIGHT: 163 LBS | HEIGHT: 69 IN | BODY MASS INDEX: 24.14 KG/M2

## 2025-08-21 DIAGNOSIS — I25.10 CORONARY ARTERY DISEASE INVOLVING NATIVE CORONARY ARTERY OF NATIVE HEART WITHOUT ANGINA PECTORIS: ICD-10-CM

## 2025-08-21 DIAGNOSIS — I10 PRIMARY HYPERTENSION: Primary | ICD-10-CM

## 2025-08-21 DIAGNOSIS — E78.5 DYSLIPIDEMIA, GOAL LDL BELOW 70: ICD-10-CM

## 2025-08-21 PROCEDURE — G2211 COMPLEX E/M VISIT ADD ON: HCPCS | Performed by: INTERNAL MEDICINE

## 2025-08-21 PROCEDURE — 99212 OFFICE O/P EST SF 10 MIN: CPT

## 2025-08-21 PROCEDURE — 3074F SYST BP LT 130 MM HG: CPT | Performed by: INTERNAL MEDICINE

## 2025-08-21 PROCEDURE — 3079F DIAST BP 80-89 MM HG: CPT | Performed by: INTERNAL MEDICINE

## 2025-08-21 PROCEDURE — 1159F MED LIST DOCD IN RCRD: CPT | Performed by: INTERNAL MEDICINE

## 2025-08-21 PROCEDURE — 99214 OFFICE O/P EST MOD 30 MIN: CPT | Performed by: INTERNAL MEDICINE

## 2025-08-21 PROCEDURE — 1160F RVW MEDS BY RX/DR IN RCRD: CPT | Performed by: INTERNAL MEDICINE

## (undated) DEVICE — Device

## (undated) DEVICE — TUBE, SALEM SUMP, 16 FR X 48IN, ENFIT

## (undated) DEVICE — GLOVE, SURGICAL, PROTEXIS PI , 7.5, PF, LF

## (undated) DEVICE — TRACKER, INSTRUMENT

## (undated) DEVICE — TOWEL, SURGICAL, NEURO, O/R, 16 X 26, BLUE, STERILE

## (undated) DEVICE — SPONGE, HEMOSTATIC, CELLULOSE, SURGICEL, 4 X 8 IN

## (undated) DEVICE — TRACKER, STINGRAY, NON-INVASIVE, AXIEM STEALTH NAVIGATION

## (undated) DEVICE — DRAPE, FLUID WARMER

## (undated) DEVICE — TRAP, SPECIMEN, NEPTUNE QUICK COLLECTOR

## (undated) DEVICE — BOWL, BASIN, 32 OZ, STERILE

## (undated) DEVICE — ELECTRODE, ELECTROSURGICAL, COAGULATOR, W/SUCTION, HANDSWITCHING, 8 FR, 6 IN